# Patient Record
Sex: FEMALE | Race: WHITE | NOT HISPANIC OR LATINO | URBAN - METROPOLITAN AREA
[De-identification: names, ages, dates, MRNs, and addresses within clinical notes are randomized per-mention and may not be internally consistent; named-entity substitution may affect disease eponyms.]

---

## 2017-05-05 ENCOUNTER — INPATIENT (INPATIENT)
Facility: HOSPITAL | Age: 82
LOS: 7 days | Discharge: HOME | DRG: 329 | End: 2017-05-13
Attending: SURGERY | Admitting: SURGERY
Payer: MEDICARE

## 2017-05-05 ENCOUNTER — RESULT REVIEW (OUTPATIENT)
Age: 82
End: 2017-05-05

## 2017-05-05 VITALS
SYSTOLIC BLOOD PRESSURE: 114 MMHG | HEART RATE: 62 BPM | DIASTOLIC BLOOD PRESSURE: 73 MMHG | RESPIRATION RATE: 18 BRPM | OXYGEN SATURATION: 99 % | TEMPERATURE: 98 F

## 2017-05-05 LAB
ALBUMIN SERPL ELPH-MCNC: 3.4 G/DL — SIGNIFICANT CHANGE UP (ref 3.4–5)
ALP SERPL-CCNC: 50 U/L — SIGNIFICANT CHANGE UP (ref 40–120)
ALT FLD-CCNC: 21 U/L — SIGNIFICANT CHANGE UP (ref 12–42)
ANION GAP SERPL CALC-SCNC: 12 MMOL/L — SIGNIFICANT CHANGE UP (ref 9–16)
APPEARANCE UR: CLEAR — SIGNIFICANT CHANGE UP
AST SERPL-CCNC: 25 U/L — SIGNIFICANT CHANGE UP (ref 15–37)
BASE EXCESS BLDA CALC-SCNC: -2.2 MMOL/L — LOW (ref -2–3)
BASE EXCESS BLDA CALC-SCNC: 1.4 MMOL/L — SIGNIFICANT CHANGE UP (ref -2–3)
BASOPHILS NFR BLD AUTO: 0.2 % — SIGNIFICANT CHANGE UP (ref 0–2)
BILIRUB SERPL-MCNC: 0.3 MG/DL — SIGNIFICANT CHANGE UP (ref 0.2–1.2)
BILIRUB UR-MCNC: NEGATIVE — SIGNIFICANT CHANGE UP
BLD GP AB SCN SERPL QL: NEGATIVE — SIGNIFICANT CHANGE UP
BUN SERPL-MCNC: 24 MG/DL — HIGH (ref 7–23)
CA-I BLDA-SCNC: 0.99 MMOL/L — LOW (ref 1.1–1.3)
CA-I BLDA-SCNC: 1.15 MMOL/L — SIGNIFICANT CHANGE UP (ref 1.1–1.3)
CALCIUM SERPL-MCNC: 8.8 MG/DL — SIGNIFICANT CHANGE UP (ref 8.5–10.5)
CHLORIDE SERPL-SCNC: 98 MMOL/L — SIGNIFICANT CHANGE UP (ref 96–108)
CO2 SERPL-SCNC: 28 MMOL/L — SIGNIFICANT CHANGE UP (ref 22–31)
COHGB MFR BLDA: 0.3 % — SIGNIFICANT CHANGE UP
COHGB MFR BLDA: 0.3 % — SIGNIFICANT CHANGE UP
COLOR SPEC: YELLOW — SIGNIFICANT CHANGE UP
CREAT SERPL-MCNC: 0.86 MG/DL — SIGNIFICANT CHANGE UP (ref 0.5–1.3)
DIFF PNL FLD: NEGATIVE — SIGNIFICANT CHANGE UP
EOSINOPHIL NFR BLD AUTO: 0.1 % — SIGNIFICANT CHANGE UP (ref 0–6)
EXTRA BLUE TOP TUBE: SIGNIFICANT CHANGE UP
GAS PNL BLDA: SIGNIFICANT CHANGE UP
GAS PNL BLDA: SIGNIFICANT CHANGE UP
GLUCOSE SERPL-MCNC: 177 MG/DL — HIGH (ref 70–99)
GLUCOSE UR QL: NEGATIVE — SIGNIFICANT CHANGE UP
HCO3 BLDA-SCNC: 22 MMOL/L — SIGNIFICANT CHANGE UP (ref 21–28)
HCO3 BLDA-SCNC: 24 MMOL/L — SIGNIFICANT CHANGE UP (ref 21–28)
HCT VFR BLD CALC: 32.8 % — LOW (ref 34.5–45)
HGB BLD-MCNC: 10.8 G/DL — LOW (ref 11.5–15.5)
HGB BLDA-MCNC: 7 G/DL — LOW (ref 11.5–15.5)
HGB BLDA-MCNC: 9.6 G/DL — LOW (ref 11.5–15.5)
KETONES UR-MCNC: >=80 MG/DL
LACTATE SERPL-SCNC: 4.4 MMOL/L — CRITICAL HIGH (ref 0.5–2)
LACTATE SERPL-SCNC: 4.9 MMOL/L — CRITICAL HIGH (ref 0.5–2)
LEUKOCYTE ESTERASE UR-ACNC: NEGATIVE — SIGNIFICANT CHANGE UP
LYMPHOCYTES # BLD AUTO: 10.6 % — LOW (ref 13–44)
MCHC RBC-ENTMCNC: 26.2 PG — LOW (ref 27–34)
MCHC RBC-ENTMCNC: 32.9 G/DL — SIGNIFICANT CHANGE UP (ref 32–36)
MCV RBC AUTO: 79.6 FL — LOW (ref 80–100)
METHGB MFR BLDA: 0.1 % — SIGNIFICANT CHANGE UP
METHGB MFR BLDA: 0.3 % — SIGNIFICANT CHANGE UP
MONOCYTES NFR BLD AUTO: 4.4 % — SIGNIFICANT CHANGE UP (ref 2–14)
NEUTROPHILS NFR BLD AUTO: 84.7 % — HIGH (ref 43–77)
NITRITE UR-MCNC: NEGATIVE — SIGNIFICANT CHANGE UP
O2 CT VFR BLDA CALC: SIGNIFICANT CHANGE UP (ref 15–23)
O2 CT VFR BLDA CALC: SIGNIFICANT CHANGE UP (ref 15–23)
OXYHGB MFR BLDA: 99 % — SIGNIFICANT CHANGE UP (ref 94–100)
OXYHGB MFR BLDA: 99 % — SIGNIFICANT CHANGE UP (ref 94–100)
PCO2 BLDA: 29 MMHG — LOW (ref 32–45)
PCO2 BLDA: 37 MMHG — SIGNIFICANT CHANGE UP (ref 32–45)
PH BLDA: 7.4 — SIGNIFICANT CHANGE UP (ref 7.35–7.45)
PH BLDA: 7.54 — HIGH (ref 7.35–7.45)
PH UR: 6 — SIGNIFICANT CHANGE UP (ref 5–8)
PLATELET # BLD AUTO: 294 K/UL — SIGNIFICANT CHANGE UP (ref 150–400)
PO2 BLDA: 218 MMHG — HIGH (ref 83–108)
PO2 BLDA: 376 MMHG — HIGH (ref 83–108)
POTASSIUM BLDA-SCNC: 4.3 MMOL/L — SIGNIFICANT CHANGE UP (ref 3.5–4.9)
POTASSIUM BLDA-SCNC: 4.3 MMOL/L — SIGNIFICANT CHANGE UP (ref 3.5–4.9)
POTASSIUM SERPL-MCNC: 4.3 MMOL/L — SIGNIFICANT CHANGE UP (ref 3.5–5.3)
POTASSIUM SERPL-SCNC: 4.3 MMOL/L — SIGNIFICANT CHANGE UP (ref 3.5–5.3)
PROT SERPL-MCNC: 6.8 G/DL — SIGNIFICANT CHANGE UP (ref 6.4–8.2)
PROT UR-MCNC: NEGATIVE MG/DL — SIGNIFICANT CHANGE UP
RBC # BLD: 4.12 M/UL — SIGNIFICANT CHANGE UP (ref 3.8–5.2)
RBC # FLD: 15.1 % — SIGNIFICANT CHANGE UP (ref 10.3–16.9)
RH IG SCN BLD-IMP: POSITIVE — SIGNIFICANT CHANGE UP
SAO2 % BLDA: 100 % — SIGNIFICANT CHANGE UP (ref 95–100)
SAO2 % BLDA: 99 % — SIGNIFICANT CHANGE UP (ref 95–100)
SODIUM BLDA-SCNC: 133 MMOL/L — LOW (ref 138–146)
SODIUM BLDA-SCNC: 134 MMOL/L — LOW (ref 138–146)
SODIUM SERPL-SCNC: 138 MMOL/L — SIGNIFICANT CHANGE UP (ref 135–145)
SP GR SPEC: 1.01 — SIGNIFICANT CHANGE UP (ref 1–1.03)
UROBILINOGEN FLD QL: 0.2 E.U./DL — SIGNIFICANT CHANGE UP
WBC # BLD: 11.5 K/UL — HIGH (ref 3.8–10.5)
WBC # FLD AUTO: 11.5 K/UL — HIGH (ref 3.8–10.5)

## 2017-05-05 PROCEDURE — 99232 SBSQ HOSP IP/OBS MODERATE 35: CPT | Mod: GC

## 2017-05-05 PROCEDURE — 99285 EMERGENCY DEPT VISIT HI MDM: CPT | Mod: 25

## 2017-05-05 PROCEDURE — 93010 ELECTROCARDIOGRAM REPORT: CPT | Mod: NC

## 2017-05-05 PROCEDURE — 71010: CPT | Mod: 26

## 2017-05-05 PROCEDURE — 74177 CT ABD & PELVIS W/CONTRAST: CPT | Mod: 26

## 2017-05-05 PROCEDURE — 44120 REMOVAL OF SMALL INTESTINE: CPT

## 2017-05-05 PROCEDURE — 99222 1ST HOSP IP/OBS MODERATE 55: CPT | Mod: 57

## 2017-05-05 RX ORDER — SODIUM CHLORIDE 9 MG/ML
1000 INJECTION, SOLUTION INTRAVENOUS
Qty: 0 | Refills: 0 | Status: DISCONTINUED | OUTPATIENT
Start: 2017-05-05 | End: 2017-05-06

## 2017-05-05 RX ORDER — INSULIN LISPRO 100/ML
VIAL (ML) SUBCUTANEOUS
Qty: 0 | Refills: 0 | Status: DISCONTINUED | OUTPATIENT
Start: 2017-05-05 | End: 2017-05-13

## 2017-05-05 RX ORDER — SODIUM CHLORIDE 9 MG/ML
1000 INJECTION INTRAMUSCULAR; INTRAVENOUS; SUBCUTANEOUS ONCE
Qty: 0 | Refills: 0 | Status: COMPLETED | OUTPATIENT
Start: 2017-05-05 | End: 2017-05-05

## 2017-05-05 RX ORDER — ONDANSETRON 8 MG/1
4 TABLET, FILM COATED ORAL EVERY 6 HOURS
Qty: 0 | Refills: 0 | Status: DISCONTINUED | OUTPATIENT
Start: 2017-05-05 | End: 2017-05-05

## 2017-05-05 RX ORDER — GLUCAGON INJECTION, SOLUTION 0.5 MG/.1ML
1 INJECTION, SOLUTION SUBCUTANEOUS ONCE
Qty: 0 | Refills: 0 | Status: DISCONTINUED | OUTPATIENT
Start: 2017-05-05 | End: 2017-05-13

## 2017-05-05 RX ORDER — DEXTROSE 50 % IN WATER 50 %
12.5 SYRINGE (ML) INTRAVENOUS ONCE
Qty: 0 | Refills: 0 | Status: DISCONTINUED | OUTPATIENT
Start: 2017-05-05 | End: 2017-05-13

## 2017-05-05 RX ORDER — DEXTROSE 50 % IN WATER 50 %
25 SYRINGE (ML) INTRAVENOUS ONCE
Qty: 0 | Refills: 0 | Status: DISCONTINUED | OUTPATIENT
Start: 2017-05-05 | End: 2017-05-13

## 2017-05-05 RX ORDER — ACETAMINOPHEN 500 MG
1000 TABLET ORAL ONCE
Qty: 0 | Refills: 0 | Status: COMPLETED | OUTPATIENT
Start: 2017-05-05 | End: 2017-05-05

## 2017-05-05 RX ORDER — PROPOFOL 10 MG/ML
9.67 INJECTION, EMULSION INTRAVENOUS
Qty: 1000 | Refills: 0 | Status: DISCONTINUED | OUTPATIENT
Start: 2017-05-05 | End: 2017-05-06

## 2017-05-05 RX ORDER — DEXTROSE 50 % IN WATER 50 %
1 SYRINGE (ML) INTRAVENOUS ONCE
Qty: 0 | Refills: 0 | Status: DISCONTINUED | OUTPATIENT
Start: 2017-05-05 | End: 2017-05-13

## 2017-05-05 RX ORDER — SODIUM CHLORIDE 9 MG/ML
1000 INJECTION INTRAMUSCULAR; INTRAVENOUS; SUBCUTANEOUS
Qty: 0 | Refills: 0 | Status: DISCONTINUED | OUTPATIENT
Start: 2017-05-05 | End: 2017-05-05

## 2017-05-05 RX ORDER — FENTANYL CITRATE 50 UG/ML
0.1 INJECTION INTRAVENOUS
Qty: 2500 | Refills: 0 | Status: DISCONTINUED | OUTPATIENT
Start: 2017-05-05 | End: 2017-05-06

## 2017-05-05 RX ORDER — SODIUM CHLORIDE 9 MG/ML
1000 INJECTION, SOLUTION INTRAVENOUS
Qty: 0 | Refills: 0 | Status: DISCONTINUED | OUTPATIENT
Start: 2017-05-05 | End: 2017-05-13

## 2017-05-05 RX ORDER — LIDOCAINE 4 G/100G
10 CREAM TOPICAL ONCE
Qty: 0 | Refills: 0 | Status: COMPLETED | OUTPATIENT
Start: 2017-05-05 | End: 2017-05-05

## 2017-05-05 RX ORDER — PANTOPRAZOLE SODIUM 20 MG/1
40 TABLET, DELAYED RELEASE ORAL DAILY
Qty: 0 | Refills: 0 | Status: DISCONTINUED | OUTPATIENT
Start: 2017-05-05 | End: 2017-05-06

## 2017-05-05 RX ORDER — IOHEXOL 300 MG/ML
50 INJECTION, SOLUTION INTRAVENOUS ONCE
Qty: 0 | Refills: 0 | Status: COMPLETED | OUTPATIENT
Start: 2017-05-05 | End: 2017-05-05

## 2017-05-05 RX ORDER — HEPARIN SODIUM 5000 [USP'U]/ML
5000 INJECTION INTRAVENOUS; SUBCUTANEOUS EVERY 8 HOURS
Qty: 0 | Refills: 0 | Status: DISCONTINUED | OUTPATIENT
Start: 2017-05-05 | End: 2017-05-13

## 2017-05-05 RX ORDER — PIPERACILLIN AND TAZOBACTAM 4; .5 G/20ML; G/20ML
3.38 INJECTION, POWDER, LYOPHILIZED, FOR SOLUTION INTRAVENOUS ONCE
Qty: 0 | Refills: 0 | Status: COMPLETED | OUTPATIENT
Start: 2017-05-05 | End: 2017-05-05

## 2017-05-05 RX ORDER — PIPERACILLIN AND TAZOBACTAM 4; .5 G/20ML; G/20ML
3.38 INJECTION, POWDER, LYOPHILIZED, FOR SOLUTION INTRAVENOUS EVERY 6 HOURS
Qty: 0 | Refills: 0 | Status: DISCONTINUED | OUTPATIENT
Start: 2017-05-05 | End: 2017-05-05

## 2017-05-05 RX ORDER — ONDANSETRON 8 MG/1
4 TABLET, FILM COATED ORAL EVERY 6 HOURS
Qty: 0 | Refills: 0 | Status: DISCONTINUED | OUTPATIENT
Start: 2017-05-05 | End: 2017-05-13

## 2017-05-05 RX ADMIN — SODIUM CHLORIDE 1000 MILLILITER(S): 9 INJECTION INTRAMUSCULAR; INTRAVENOUS; SUBCUTANEOUS at 16:00

## 2017-05-05 RX ADMIN — SODIUM CHLORIDE 75 MILLILITER(S): 9 INJECTION INTRAMUSCULAR; INTRAVENOUS; SUBCUTANEOUS at 19:05

## 2017-05-05 RX ADMIN — Medication 30 MILLILITER(S): at 14:37

## 2017-05-05 RX ADMIN — Medication 1000 MILLIGRAM(S): at 19:08

## 2017-05-05 RX ADMIN — PIPERACILLIN AND TAZOBACTAM 200 GRAM(S): 4; .5 INJECTION, POWDER, LYOPHILIZED, FOR SOLUTION INTRAVENOUS at 19:07

## 2017-05-05 RX ADMIN — LIDOCAINE 10 MILLILITER(S): 4 CREAM TOPICAL at 14:37

## 2017-05-05 RX ADMIN — SODIUM CHLORIDE 1000 MILLILITER(S): 9 INJECTION INTRAMUSCULAR; INTRAVENOUS; SUBCUTANEOUS at 13:45

## 2017-05-05 RX ADMIN — IOHEXOL 50 MILLILITER(S): 300 INJECTION, SOLUTION INTRAVENOUS at 14:00

## 2017-05-05 RX ADMIN — Medication 400 MILLIGRAM(S): at 14:27

## 2017-05-05 RX ADMIN — SODIUM CHLORIDE 75 MILLILITER(S): 9 INJECTION INTRAMUSCULAR; INTRAVENOUS; SUBCUTANEOUS at 16:04

## 2017-05-05 NOTE — ED PROVIDER NOTE - OBJECTIVE STATEMENT
abdominal pain  89 yo , reports several days of constipation followed by extremely large BM today, pt reports this morning started to have severe lower abd pain which has persisted, no ho of abd surgeries, + nausea, + vomiting, no chest pain, no SOB, + lightheadness when abd pain occurs.

## 2017-05-05 NOTE — ED ADULT NURSE NOTE - OBJECTIVE STATEMENT
Pt presents to ED c/o abdominal pain since Tuesday. Pt states "I couldn't pass a BM Tuesday Wednesday or yesterday. Today I strained and passed a large soft BM but now I still have pain. Also after I went I started vomiting copiously." Pt endorses 10/10 generalized abdominal pain, denies fevers, chills, CP, SOB, difficulty urinating. Pt presents in NAD speaking full sentences via EMS stretcher through triage. Pt c/o pain and nausea, though declining pain medication and anti-nausea medication at this time.

## 2017-05-05 NOTE — H&P ADULT - ATTENDING COMMENTS
I have seen and examined the patient in the ED. CT scan images reviewed. I think she has small bowel obstruction causing the mesenteric edema from an internal hernia. Patient needs fluid resuscitation and urgent surgical exploration.

## 2017-05-05 NOTE — ED ADULT NURSE REASSESSMENT NOTE - NS ED NURSE REASSESS COMMENT FT1
Pt c/o pain and nausea, declining pain medication and zofran, states "I don't want medication." MD Brunson spoke with pt, agreed on IV Tylenol, administered as ordered by JANET Poole. Pt also states "the drink for the scan is burning my chest." PO lidocaine and maalox administered by JANET Poole as ordered. Pt now states "My whole mouth is numb, I can't swallow anymore I can't drink anymore." Explained numbness as an intended effect of medication. Pt states "I'm so thirsty," pt refusing to drink PO contrast dye though requesting plain water. MD Brunson aware, pt to have CT scan without oral contrast. Pt transported to CT by [facility]. Pt pending repeat lactate draw on completion of first NS bolus per MD Brunson and CT results. Will continue to monitor.

## 2017-05-05 NOTE — H&P ADULT - NSHPLABSRESULTS_GEN_ALL_CORE
LABS:                        10.8   11.5  )-----------( 294      ( 05 May 2017 13:25 )             32.8     05-05    138  |  98  |  24<H>  ----------------------------<  177<H>  4.3   |  28  |  0.86    Ca    8.8      05 May 2017 13:25    TPro  6.8  /  Alb  3.4  /  TBili  0.3  /  DBili  x   /  AST  25  /  ALT  21  /  AlkPhos  50  05-05      RADIOLOGY & ADDITIONAL STUDIES:      EXAM:  CT ABDOMEN AND PELVIS IC                          PROCEDURE DATE:  05/05/2017    Quantity of Contrast in Vial in ml: 100 Contrast Used: Optiray 350  Quantity of Contrast Wasted in ml: 5    INTERPRETATION:  CT of the ABDOMEN and PELVISwith intravenous contrast   dated 5/5/2017 3:39 PM    INDICATION: Abdominal pain, constipation, nausea vomiting. Appendectomy    TECHNIQUE: CT of the abdomen and pelvis was performed. Axial, sagittal   and coronal images were produced and reviewed.    PRIOR STUDIES: None.    FINDINGS: Images of the lower chest demonstrate mild aortic valve   calcification. Moderate aortic root calcification. Left ventricular   hypertrophy. Small hiatal hernia.    0.4 cm hypodensity in the left lobe of the liver, too small to   characterize.  No radiopaque stones are seen in the gallbladder.  Mildly   dilated downstream pancreatic duct measuring 0.4 cm. 0.9 cm cystic lesion   in the head of the pancreas. No splenic abnormalities are seen.    The adrenal glandsare unremarkable. A few small areas of cortical   scarring left kidney.        No abdominal aortic aneurysm is seen. Severe atherosclerotic   calcification of the abdominal aorta and its major branches. No obvious   visceral arterial or venous compromise. No lymphadenopathy is seen.     Evaluation of the bowel demonstrates mild dilatation of multiple distal   small bowel loops which show no wall enhancement. There is marked   surrounding edema of the small bowel mesentery. Findings are suspicious  for small bowel ischemia as there is hyperenhancement of proximal small   bowel and colon. Colonic diverticula noted. Stomach distended. Small to   moderate ascites is seen. No pneumatosis or free air.    Images of the pelvis demonstrate the uterusand adnexae to be normal in   appearance.      Evaluation of the osseous structures demonstrates mild to moderate   degenerative changes. Grade 1 anterolisthesis of L4 in relation to L5.      IMPRESSION:  Findings suspicious for ischemia of distal small bowel with marked   surrounding mesenteric edema. No pneumatosis or free air.    Small to moderate ascites.    Diverticulosis without evidence of diverticulitis.    Mildly dilated downstream pancreatic duct. 0.9 cm cystic lesion in the   head of the pancreas. Nonemergent MRI may be useful for further   evaluation.    Findings were discussed with REN MEDEL on 5/5/2017 4:19 PM with   read back.      MANJULA CANNON M.D., ATTENDING RADIOLOGIST  This document has been electronically signed. May  5 2017  4:21PM LABS:                        10.8   11.5  )-----------( 294      ( 05 May 2017 13:25 )             32.8     05-05    138  |  98  |  24<H>  ----------------------------<  177<H>  4.3   |  28  |  0.86    Ca    8.8      05 May 2017 13:25    TPro  6.8  /  Alb  3.4  /  TBili  0.3  /  DBili  x   /  AST  25  /  ALT  21  /  AlkPhos  50  05-05      RADIOLOGY & ADDITIONAL STUDIES:      EXAM:  CT ABDOMEN AND PELVIS IC                          PROCEDURE DATE:  05/05/2017    Quantity of Contrast in Vial in ml: 100 Contrast Used: Optiray 350  Quantity of Contrast Wasted in ml: 5    INTERPRETATION:  CT of the ABDOMEN and PELVISwith intravenous contrast   dated 5/5/2017 3:39 PM    INDICATION: Abdominal pain, constipation, nausea vomiting. Appendectomy    TECHNIQUE: CT of the abdomen and pelvis was performed. Axial, sagittal   and coronal images were produced and reviewed.    PRIOR STUDIES: None.    FINDINGS: Images of the lower chest demonstrate mild aortic valve   calcification. Moderate aortic root calcification. Left ventricular   hypertrophy. Small hiatal hernia.    0.4 cm hypodensity in the left lobe of the liver, too small to   characterize.  No radiopaque stones are seen in the gallbladder.  Mildly   dilated downstream pancreatic duct measuring 0.4 cm. 0.9 cm cystic lesion   in the head of the pancreas. No splenic abnormalities are seen.    The adrenal glands are unremarkable. A few small areas of cortical   scarring left kidney.        No abdominal aortic aneurysm is seen. Severe atherosclerotic   calcification of the abdominal aorta and its major branches. No obvious   visceral arterial or venous compromise. No lymphadenopathy is seen.     Evaluation of the bowel demonstrates mild dilatation of multiple distal   small bowel loops which show no wall enhancement. There is marked   surrounding edema of the small bowel mesentery. Findings are suspicious  for small bowel ischemia as there is hyper enhancement of proximal small   bowel and colon. Colonic diverticula noted. Stomach distended. Small to   moderate ascites is seen. No pneumatosis or free air.    Images of the pelvis demonstrate the uterus and adnexae to be normal in   appearance.      Evaluation of the osseous structures demonstrates mild to moderate   degenerative changes. Grade 1 anterolisthesis of L4 in relation to L5.      IMPRESSION:  Findings suspicious for ischemia of distal small bowel with marked   surrounding mesenteric edema. No pneumatosis or free air.    Small to moderate ascites.    Diverticulosis without evidence of diverticulitis.    Mildly dilated downstream pancreatic duct. 0.9 cm cystic lesion in the   head of the pancreas. Nonemergent MRI may be useful for further   evaluation.    Findings were discussed with REN MEDEL on 5/5/2017 4:19 PM with   read back.      MANJULA CANNON M.D., ATTENDING RADIOLOGIST  This document has been electronically signed. May  5 2017  4:21PM

## 2017-05-05 NOTE — CONSULT NOTE ADULT - ATTENDING COMMENTS
89F ischemic bowel, sp resection of mid small bowel.   1. hemodynamically adequate. lactaet nl suggesting adequate perfusion  2. keep on mech vent overnight. sedation analgesia  3. ivf maintenance  4. zosyn perioperative abx  5. ngt

## 2017-05-05 NOTE — CONSULT NOTE ADULT - ASSESSMENT
88 yo F with hemorrhagic ischemic bowel now s/p exlap with SBR  Neuro: propofol, fentanyl  CV: LR @120. Normotensive goals  Pulm: CMV  GI: NPO, protonics NGT  : Cartagena  Endo: ISS.   ID: zosyn (5/6---_  Lines: PIV, a-line

## 2017-05-05 NOTE — H&P ADULT - ASSESSMENT
89F w/ sudden onset abdominal pain likely 2/2 to small bowel ischemia as evidenced on CT scan   - Admit to general surgery, telemetry  - NPO/IVF  - hold NGT for now given patient's apprehension, but place if severe vomiting  - Nausea control PRN  - Pain control without narcotics  - Serial abdominal exams  - Serial Lactates

## 2017-05-05 NOTE — ED ADULT NURSE REASSESSMENT NOTE - NS ED NURSE REASSESS COMMENT FT1
Pt remains in ED pending bed assignment or OR. Pt placed on portable continuous cardiac monitor. 14F Cartagena placed by RNs Melly and Royal as per surgery order. Pt with maintenance fluids increased to 150ml/hr per surgery request, pt also with zosyn 3.375 mg hanging as ordered. Will continue to monitor.

## 2017-05-05 NOTE — H&P ADULT - HISTORY OF PRESENT ILLNESS
89F w/ questionable PMH of dementia, hyperparathyroidism, and recent dental procedure presented to [facility] ED w/ complaints of severe abdominal pain with associated nausea/vomiting. Patient reports no abdominal surgeries in the past. Reports that she never experienced anything like this before. Denies hx of Afib. Denies any pertinent cardiac history. Denies f/c at home. Reports normal BM earlier today. 89F w/ questionable PMH of dementia, hyperparathyroidism, and recent dental procedure presented to [facility] ED w/ complaints of severe abdominal pain with associated nausea/vomiting. Patient reports no abdominal surgeries in the past. Reports that she never experienced anything like this before. Denies hx of Afib. Denies any pertinent cardiac history. Denies f/c at home. Reports having to strain in order to move her bowel today.

## 2017-05-05 NOTE — CONSULT NOTE ADULT - SUBJECTIVE AND OBJECTIVE BOX
HPI:      PAST MEDICAL & SURGICAL HISTORY:  Depression, unspecified depression type  Gastroesophageal reflux disease without esophagitis  HTN (hypertension)  History of cholecystectomy  No significant past surgical history      REVIEW OF SYSTEMS      General:	Intubated, sedated. unable to obtain ROS.     Skin/Breast:  	  Ophthalmologic:  	  ENMT:	    Respiratory and Thorax:  	  Cardiovascular:	    Gastrointestinal:	    Genitourinary:	    Musculoskeletal:	    Neurological:	    Psychiatric:	    Hematology/Lymphatics:	    Endocrine:	    Allergic/Immunologic:	    MEDICATIONS  (STANDING):    MEDICATIONS  (PRN):      Allergies    ibuprofen (Hives)  Toradol (Rash)    Intolerances        SOCIAL HISTORY:    FAMILY HISTORY:      Vital Signs Last 24 Hrs  T(C): 36.8, Max: 36.8 (05-05 @ 20:36)  T(F): 98.2, Max: 98.2 (05-05 @ 20:36)  HR: 98 (98 - 125)  BP: 121/83 (115/85 - 132/75)  BP(mean): --  RR: 20 (18 - 20)  SpO2: 97% (97% - 98%)    PHYSICAL EXAM:      Constitutional:    Eyes:    ENMT:    Neck:    Breasts:    Back:    Respiratory:    Cardiovascular:    Gastrointestinal:    Genitourinary:    Rectal:    Extremities:    Vascular:    Neurological:    Skin:    Lymph Nodes:    Musculoskeletal:    Psychiatric:        LABS:                        14.9   10.8  )-----------( 327      ( 05 May 2017 17:39 )             42.1     05-05    140  |  106  |  17  ----------------------------<  137<H>  4.1   |  22  |  0.77    Ca    8.8      05 May 2017 17:39    TPro  7.9  /  Alb  3.3<L>  /  TBili  0.5  /  DBili  x   /  AST  25  /  ALT  23  /  AlkPhos  87  05-05    PT/INR - ( 05 May 2017 17:39 )   PT: 12.0 sec;   INR: 1.08                RADIOLOGY & ADDITIONAL STUDIES: HPI:  89 yo F with h/o hypothyroidism presented with hemorrhagic ischemic bowel now s/p  bowel resection. She is intubated and HD stable.       PAST MEDICAL & SURGICAL HISTORY:  Depression, unspecified depression type  Gastroesophageal reflux disease without esophagitis  HTN (hypertension)  History of cholecystectomy  No significant past surgical history      REVIEW OF SYSTEMS      General:	Intubated, sedated. unable to obtain ROS.     MEDICATIONS  (STANDING):    MEDICATIONS  (PRN):      Allergies    ibuprofen (Hives)  Toradol (Rash)    Intolerances        SOCIAL HISTORY:  non-smoker    FAMILY HISTORY:      Vital Signs Last 24 Hrs  T(C): 36.8, Max: 36.8 (05-05 @ 20:36)  T(F): 98.2, Max: 98.2 (05-05 @ 20:36)  HR: 98 (98 - 125)  BP: 121/83 (115/85 - 132/75)  BP(mean): --  RR: 20 (18 - 20)  SpO2: 97% (97% - 98%)    PHYSICAL EXAM:      Constitutional: Intubated, sedated    Eyes:     ENMT:    Neck:    Breasts:    Back:    Respiratory:    Cardiovascular:    Gastrointestinal:    Genitourinary:    Rectal:    Extremities:    Vascular:    Neurological:    Skin:    Lymph Nodes:    Musculoskeletal:    Psychiatric:        LABS:                        14.9   10.8  )-----------( 327      ( 05 May 2017 17:39 )             42.1     05-05    140  |  106  |  17  ----------------------------<  137<H>  4.1   |  22  |  0.77    Ca    8.8      05 May 2017 17:39    TPro  7.9  /  Alb  3.3<L>  /  TBili  0.5  /  DBili  x   /  AST  25  /  ALT  23  /  AlkPhos  87  05-05    PT/INR - ( 05 May 2017 17:39 )   PT: 12.0 sec;   INR: 1.08                RADIOLOGY & ADDITIONAL STUDIES: HPI:  88 yo F with h/o hypothyroidism presented with hemorrhagic ischemic bowel now s/p  bowel resection. She is intubated and HD stable.       PAST MEDICAL & SURGICAL HISTORY:  Depression, unspecified depression type  Gastroesophageal reflux disease without esophagitis  HTN (hypertension)  History of cholecystectomy  No significant past surgical history      REVIEW OF SYSTEMS      General:	Intubated, sedated. unable to obtain ROS.     MEDICATIONS  (STANDING):    MEDICATIONS  (PRN):      Allergies    ibuprofen (Hives)  Toradol (Rash)    Intolerances        SOCIAL HISTORY:  non-smoker    FAMILY HISTORY:      Vital Signs Last 24 Hrs  T(C): 36.8, Max: 36.8 (05-05 @ 20:36)  T(F): 98.2, Max: 98.2 (05-05 @ 20:36)  HR: 98 (98 - 125)  BP: 121/83 (115/85 - 132/75)  BP(mean): --  RR: 20 (18 - 20)  SpO2: 97% (97% - 98%)    PHYSICAL EXAM:      Constitutional: Intubated, sedated    Eyes: PERRL    ENMT:NCAT    Respiratory:CMV, CTAB    Cardiovascular: RRR    Gastrointestinal:Soft, appropriately tender, ND    Genitourinary: Cartagena in place    Extremities: cool with brisk cap refill    Vascular: +fem pulses. No pedal pulses b/l.     Neurological: responds to commands. Moves all 4 extremities    Skin: no lesions    Musculoskeletal: decreased muscle bulk.           LABS:                        14.9   10.8  )-----------( 327      ( 05 May 2017 17:39 )             42.1     05-05    140  |  106  |  17  ----------------------------<  137<H>  4.1   |  22  |  0.77    Ca    8.8      05 May 2017 17:39    TPro  7.9  /  Alb  3.3<L>  /  TBili  0.5  /  DBili  x   /  AST  25  /  ALT  23  /  AlkPhos  87  05-05    PT/INR - ( 05 May 2017 17:39 )   PT: 12.0 sec;   INR: 1.08                RADIOLOGY & ADDITIONAL STUDIES:

## 2017-05-05 NOTE — ED ADULT TRIAGE NOTE - CHIEF COMPLAINT QUOTE
BIBA from home c/o abdominal pain accompanied with nausea. Pt  reports being constipated yesterday and had +BM today described as being large amount.

## 2017-05-05 NOTE — ED PROVIDER NOTE - CONSTITUTIONAL, MLM
normal... Well appearing, well nourished, awake, alert, oriented to person, place, time/situation , appears uncomfortable

## 2017-05-05 NOTE — ED PROVIDER NOTE - PSYCHIATRIC, MLM
Alert and oriented to person, place, time/situation. odd affect, . no apparent risk to self or others.

## 2017-05-05 NOTE — ED PROVIDER NOTE - MEDICAL DECISION MAKING DETAILS
88 yo with abd pain, constipation, n/v.  Suspect may be secondary to hypocalcemia, also consider colitis, diverticulitis, SBO, appy, consider mesenteric ischemia as elevated lactic but less likely as no afib.

## 2017-05-05 NOTE — H&P ADULT - NSHPPHYSICALEXAM_GEN_ALL_CORE
Vital Signs Last 24 Hrs  T(C): 36.7, Max: 36.7 (05-05 @ 18:38)  T(F): 98, Max: 98 (05-05 @ 18:38)  HR: 86 (62 - 88)  BP: 99/68 (92/61 - 114/73)  BP(mean): --  RR: 18 (18 - 18)  SpO2: 94% (94% - 99%)    PHYSICAL EXAM:    Constitutional: A&Ox3  Respiratory: non labored breathing, no respiratory distress  Cardiovascular: NSR, RRR  Gastrointestinal: Soft, MD, TTP worst in RLQ   Genitourinary:  Extremities: (-) edema Vital Signs Last 24 Hrs  T(C): 36.7, Max: 36.7 (05-05 @ 18:38)  T(F): 98, Max: 98 (05-05 @ 18:38)  HR: 86 (62 - 88)  BP: 99/68 (92/61 - 114/73)  BP(mean): --  RR: 18 (18 - 18)  SpO2: 94% (94% - 99%)    PHYSICAL EXAM:    Constitutional: A&Ox3  Respiratory: non labored breathing, no respiratory distress  Cardiovascular: NSR, RRR  Gastrointestinal: Soft, MD, TTP worst in the mid-abdomen and not well localized. Heme positive stool.   Genitourinary:  Extremities: (-) edema

## 2017-05-05 NOTE — ED PROVIDER NOTE - PROGRESS NOTE DETAILS
pt w elevated lactic, as 89 , concern for fluid overload w too aggressive bolus'ing , vitals ok, will repeat Lactic acid after administration of 1 L NS and initiate maintenance fluids, will consider additional bolus if remains elevated. surgery aware

## 2017-05-06 DIAGNOSIS — E21.3 HYPERPARATHYROIDISM, UNSPECIFIED: ICD-10-CM

## 2017-05-06 DIAGNOSIS — R10.9 UNSPECIFIED ABDOMINAL PAIN: ICD-10-CM

## 2017-05-06 LAB
ALBUMIN SERPL ELPH-MCNC: 2.1 G/DL — LOW (ref 3.4–5)
ANION GAP SERPL CALC-SCNC: 8 MMOL/L — LOW (ref 9–16)
ANION GAP SERPL CALC-SCNC: 9 MMOL/L — SIGNIFICANT CHANGE UP (ref 9–16)
APTT BLD: 34.1 SEC — SIGNIFICANT CHANGE UP (ref 27.5–37.4)
BASE EXCESS BLDA CALC-SCNC: -0.8 MMOL/L — SIGNIFICANT CHANGE UP (ref -2–3)
BASE EXCESS BLDA CALC-SCNC: -1.7 MMOL/L — SIGNIFICANT CHANGE UP (ref -2–3)
BASE EXCESS BLDA CALC-SCNC: 0.2 MMOL/L — SIGNIFICANT CHANGE UP (ref -2–3)
BUN SERPL-MCNC: 23 MG/DL — SIGNIFICANT CHANGE UP (ref 7–23)
BUN SERPL-MCNC: 25 MG/DL — HIGH (ref 7–23)
CA-I BLDA-SCNC: 0.98 MMOL/L — LOW (ref 1.1–1.3)
CALCIUM SERPL-MCNC: 7.4 MG/DL — LOW (ref 8.5–10.5)
CALCIUM SERPL-MCNC: 7.5 MG/DL — LOW (ref 8.5–10.5)
CALCIUM SERPL-MCNC: 7.5 MG/DL — LOW (ref 8.5–10.5)
CHLORIDE SERPL-SCNC: 107 MMOL/L — SIGNIFICANT CHANGE UP (ref 96–108)
CHLORIDE SERPL-SCNC: 107 MMOL/L — SIGNIFICANT CHANGE UP (ref 96–108)
CO2 SERPL-SCNC: 26 MMOL/L — SIGNIFICANT CHANGE UP (ref 22–31)
CO2 SERPL-SCNC: 26 MMOL/L — SIGNIFICANT CHANGE UP (ref 22–31)
COHGB MFR BLDA: 0.3 % — SIGNIFICANT CHANGE UP
CREAT SERPL-MCNC: 0.81 MG/DL — SIGNIFICANT CHANGE UP (ref 0.5–1.3)
CREAT SERPL-MCNC: 0.81 MG/DL — SIGNIFICANT CHANGE UP (ref 0.5–1.3)
CULTURE RESULTS: NO GROWTH — SIGNIFICANT CHANGE UP
GAS PNL BLDA: SIGNIFICANT CHANGE UP
GLUCOSE SERPL-MCNC: 136 MG/DL — HIGH (ref 70–99)
GLUCOSE SERPL-MCNC: 145 MG/DL — HIGH (ref 70–99)
HBA1C BLD-MCNC: 5.3 % — SIGNIFICANT CHANGE UP (ref 4.8–5.6)
HCO3 BLDA-SCNC: 23 MMOL/L — SIGNIFICANT CHANGE UP (ref 21–28)
HCO3 BLDA-SCNC: 24 MMOL/L — SIGNIFICANT CHANGE UP (ref 21–28)
HCO3 BLDA-SCNC: 25 MMOL/L — SIGNIFICANT CHANGE UP (ref 21–28)
HCT VFR BLD CALC: 27 % — LOW (ref 34.5–45)
HCT VFR BLD CALC: 30.6 % — LOW (ref 34.5–45)
HGB BLD-MCNC: 10.4 G/DL — LOW (ref 11.5–15.5)
HGB BLD-MCNC: 9.2 G/DL — LOW (ref 11.5–15.5)
HGB BLDA-MCNC: 8.7 G/DL — LOW (ref 11.5–15.5)
INR BLD: 1.18 — HIGH (ref 0.88–1.16)
LACTATE SERPL-SCNC: 1 MMOL/L — SIGNIFICANT CHANGE UP (ref 0.5–2)
MAGNESIUM SERPL-MCNC: 1.6 MG/DL — SIGNIFICANT CHANGE UP (ref 1.6–2.4)
MAGNESIUM SERPL-MCNC: 3.2 MG/DL — HIGH (ref 1.6–2.4)
MCHC RBC-ENTMCNC: 27.4 PG — SIGNIFICANT CHANGE UP (ref 27–34)
MCHC RBC-ENTMCNC: 27.6 PG — SIGNIFICANT CHANGE UP (ref 27–34)
MCHC RBC-ENTMCNC: 34 G/DL — SIGNIFICANT CHANGE UP (ref 32–36)
MCHC RBC-ENTMCNC: 34.1 G/DL — SIGNIFICANT CHANGE UP (ref 32–36)
MCV RBC AUTO: 80.7 FL — SIGNIFICANT CHANGE UP (ref 80–100)
MCV RBC AUTO: 81.1 FL — SIGNIFICANT CHANGE UP (ref 80–100)
O2 CT VFR BLDA CALC: SIGNIFICANT CHANGE UP (ref 15–23)
OXYHGB MFR BLDA: 99 % — SIGNIFICANT CHANGE UP (ref 94–100)
PCO2 BLDA: 41 MMHG — SIGNIFICANT CHANGE UP (ref 32–45)
PCO2 BLDA: 41 MMHG — SIGNIFICANT CHANGE UP (ref 32–45)
PCO2 BLDA: 43 MMHG — SIGNIFICANT CHANGE UP (ref 32–45)
PH BLDA: 7.38 — SIGNIFICANT CHANGE UP (ref 7.35–7.45)
PH BLDA: 7.39 — SIGNIFICANT CHANGE UP (ref 7.35–7.45)
PH BLDA: 7.39 — SIGNIFICANT CHANGE UP (ref 7.35–7.45)
PHOSPHATE SERPL-MCNC: 5.1 MG/DL — HIGH (ref 2.5–4.5)
PHOSPHATE SERPL-MCNC: 5.2 MG/DL — HIGH (ref 2.5–4.5)
PLATELET # BLD AUTO: 173 K/UL — SIGNIFICANT CHANGE UP (ref 150–400)
PLATELET # BLD AUTO: 191 K/UL — SIGNIFICANT CHANGE UP (ref 150–400)
PO2 BLDA: 142 MMHG — HIGH (ref 83–108)
PO2 BLDA: 144 MMHG — HIGH (ref 83–108)
PO2 BLDA: 352 MMHG — HIGH (ref 83–108)
POTASSIUM BLDA-SCNC: 4.3 MMOL/L — SIGNIFICANT CHANGE UP (ref 3.5–4.9)
POTASSIUM SERPL-MCNC: 4.5 MMOL/L — SIGNIFICANT CHANGE UP (ref 3.5–5.3)
POTASSIUM SERPL-MCNC: 4.5 MMOL/L — SIGNIFICANT CHANGE UP (ref 3.5–5.3)
POTASSIUM SERPL-SCNC: 4.5 MMOL/L — SIGNIFICANT CHANGE UP (ref 3.5–5.3)
POTASSIUM SERPL-SCNC: 4.5 MMOL/L — SIGNIFICANT CHANGE UP (ref 3.5–5.3)
PROTHROM AB SERPL-ACNC: 13.1 SEC — HIGH (ref 9.8–12.7)
RBC # BLD: 3.33 M/UL — LOW (ref 3.8–5.2)
RBC # BLD: 3.79 M/UL — LOW (ref 3.8–5.2)
RBC # FLD: 15 % — SIGNIFICANT CHANGE UP (ref 10.3–16.9)
RBC # FLD: 15.2 % — SIGNIFICANT CHANGE UP (ref 10.3–16.9)
SAO2 % BLDA: 99 % — SIGNIFICANT CHANGE UP (ref 95–100)
SODIUM BLDA-SCNC: 134 MMOL/L — LOW (ref 138–146)
SODIUM SERPL-SCNC: 141 MMOL/L — SIGNIFICANT CHANGE UP (ref 135–145)
SODIUM SERPL-SCNC: 142 MMOL/L — SIGNIFICANT CHANGE UP (ref 135–145)
SPECIMEN SOURCE: SIGNIFICANT CHANGE UP
WBC # BLD: 8.6 K/UL — SIGNIFICANT CHANGE UP (ref 3.8–10.5)
WBC # BLD: 9.8 K/UL — SIGNIFICANT CHANGE UP (ref 3.8–10.5)
WBC # FLD AUTO: 8.6 K/UL — SIGNIFICANT CHANGE UP (ref 3.8–10.5)
WBC # FLD AUTO: 9.8 K/UL — SIGNIFICANT CHANGE UP (ref 3.8–10.5)

## 2017-05-06 PROCEDURE — 71010: CPT | Mod: 26

## 2017-05-06 PROCEDURE — 99233 SBSQ HOSP IP/OBS HIGH 50: CPT | Mod: GC

## 2017-05-06 RX ORDER — BENZOCAINE 10 %
1 GEL (GRAM) MUCOUS MEMBRANE EVERY 4 HOURS
Qty: 0 | Refills: 0 | Status: DISCONTINUED | OUTPATIENT
Start: 2017-05-06 | End: 2017-05-13

## 2017-05-06 RX ORDER — ACETAMINOPHEN 500 MG
1000 TABLET ORAL ONCE
Qty: 0 | Refills: 0 | Status: COMPLETED | OUTPATIENT
Start: 2017-05-06 | End: 2017-05-07

## 2017-05-06 RX ORDER — PIPERACILLIN AND TAZOBACTAM 4; .5 G/20ML; G/20ML
3.38 INJECTION, POWDER, LYOPHILIZED, FOR SOLUTION INTRAVENOUS EVERY 8 HOURS
Qty: 0 | Refills: 0 | Status: DISCONTINUED | OUTPATIENT
Start: 2017-05-06 | End: 2017-05-07

## 2017-05-06 RX ORDER — HYDROMORPHONE HYDROCHLORIDE 2 MG/ML
0.5 INJECTION INTRAMUSCULAR; INTRAVENOUS; SUBCUTANEOUS
Qty: 0 | Refills: 0 | Status: DISCONTINUED | OUTPATIENT
Start: 2017-05-06 | End: 2017-05-06

## 2017-05-06 RX ORDER — MAGNESIUM SULFATE 500 MG/ML
2 VIAL (ML) INJECTION ONCE
Qty: 0 | Refills: 0 | Status: COMPLETED | OUTPATIENT
Start: 2017-05-06 | End: 2017-05-06

## 2017-05-06 RX ORDER — SODIUM CHLORIDE 9 MG/ML
1000 INJECTION, SOLUTION INTRAVENOUS
Qty: 0 | Refills: 0 | Status: DISCONTINUED | OUTPATIENT
Start: 2017-05-06 | End: 2017-05-07

## 2017-05-06 RX ADMIN — HEPARIN SODIUM 5000 UNIT(S): 5000 INJECTION INTRAVENOUS; SUBCUTANEOUS at 14:35

## 2017-05-06 RX ADMIN — PIPERACILLIN AND TAZOBACTAM 25 GRAM(S): 4; .5 INJECTION, POWDER, LYOPHILIZED, FOR SOLUTION INTRAVENOUS at 14:34

## 2017-05-06 RX ADMIN — Medication 50 GRAM(S): at 03:07

## 2017-05-06 RX ADMIN — HYDROMORPHONE HYDROCHLORIDE 0.5 MILLIGRAM(S): 2 INJECTION INTRAMUSCULAR; INTRAVENOUS; SUBCUTANEOUS at 20:00

## 2017-05-06 RX ADMIN — PIPERACILLIN AND TAZOBACTAM 200 GRAM(S): 4; .5 INJECTION, POWDER, LYOPHILIZED, FOR SOLUTION INTRAVENOUS at 22:45

## 2017-05-06 RX ADMIN — PROPOFOL 3 MICROGRAM(S)/KG/MIN: 10 INJECTION, EMULSION INTRAVENOUS at 00:30

## 2017-05-06 RX ADMIN — HYDROMORPHONE HYDROCHLORIDE 0.5 MILLIGRAM(S): 2 INJECTION INTRAMUSCULAR; INTRAVENOUS; SUBCUTANEOUS at 19:45

## 2017-05-06 RX ADMIN — HEPARIN SODIUM 5000 UNIT(S): 5000 INJECTION INTRAVENOUS; SUBCUTANEOUS at 05:19

## 2017-05-06 RX ADMIN — FENTANYL CITRATE 0.52 MICROGRAM(S)/KG/HR: 50 INJECTION INTRAVENOUS at 02:50

## 2017-05-06 RX ADMIN — PIPERACILLIN AND TAZOBACTAM 25 GRAM(S): 4; .5 INJECTION, POWDER, LYOPHILIZED, FOR SOLUTION INTRAVENOUS at 05:19

## 2017-05-06 RX ADMIN — HEPARIN SODIUM 5000 UNIT(S): 5000 INJECTION INTRAVENOUS; SUBCUTANEOUS at 22:45

## 2017-05-06 NOTE — PROGRESS NOTE ADULT - SUBJECTIVE AND OBJECTIVE BOX
S: Pt sedated, ROS deferred    Vitals: Vital Signs Last 24 Hrs  T(C): 36.5, Max: 36.7 ( @ 18:38)  T(F): 97.7, Max: 98 ( @ 18:38)  HR: 58 (58 - 88)  BP: 134/62 (92/61 - 185/89)  BP(mean): --  RR: 10 (8 - 18)  SpO2: 100% (94% - 100%)    CAPILLARY BLOOD GLUCOSE      I & Os for 24h ending  @ 07:00  =============================================  IN: 1299.5 ml / OUT: 920 ml / NET: 379.5 ml    I & Os for current day (as of  @ 08:50)  =============================================  IN: 132 ml / OUT: 35 ml / NET: 97 ml        Weight (kg): 51.7 ( @ 14:05)    Labs:                         10.4   9.8   )-----------( 173      ( 06 May 2017 04:45 )             30.6   -06    141  |  107  |  23  ----------------------------<  145<H>  4.5   |  26  |  0.81    Ca    7.5<L>      06 May 2017 04:45  Phos  5.2     05-06  Mg     3.2     -06    TPro  6.8  /  Alb  3.4  /  TBili  0.3  /  DBili  x   /  AST  25  /  ALT  21  /  AlkPhos  50  05-05  PT/INR - ( 06 May 2017 01:13 )   PT: 13.1 sec;   INR: 1.18          PTT - ( 06 May 2017 01:13 )  PTT:34.1 secUrinalysis Basic - ( 05 May 2017 19:12 )    Color: Yellow / Appearance: Clear / S.015 / pH: x  Gluc: x / Ketone: >=80 mg/dL  / Bili: NEGATIVE / Urobili: 0.2 E.U./dL   Blood: x / Protein: NEGATIVE mg/dL / Nitrite: NEGATIVE   Leuk Esterase: NEGATIVE / RBC: x / WBC x   Sq Epi: x / Non Sq Epi: x / Bacteria: x        Electrolytes repleated to goals as follows: Potassium 4, Phosphorus 3 and Magnesium 2 where appropriate and necessary    Exam:  Neuro: sedated  CV: RRR, no MRGs  Pulm: CTAB, no wheezes, rales ronzenaida  Abd: BS (-), Soft, midline incision dressing c/d/i, NT, ND  Ext: No edema peripherally or centrally  Vasc: Extremities warm to palpation, 2+ pulses - radial and PT S: Pt sedated, ROS deferred    Vitals: Vital Signs Last 24 Hrs  T(C): 36.5, Max: 36.7 ( @ 18:38)  T(F): 97.7, Max: 98 ( @ 18:38)  HR: 58 (58 - 88)  BP: 134/62 (92/61 - 185/89)  BP(mean): --  RR: 10 (8 - 18)  SpO2: 100% (94% - 100%)    CAPILLARY BLOOD GLUCOSE      I & Os for 24h ending  @ 07:00  =============================================  IN: 1299.5 ml / OUT: 920 ml / NET: 379.5 ml    I & Os for current day (as of  @ 08:50)  =============================================  IN: 132 ml / OUT: 35 ml / NET: 97 ml        Weight (kg): 51.7 ( @ 14:05)    Labs:                         10.4   9.8   )-----------( 173      ( 06 May 2017 04:45 )             30.6   -06    141  |  107  |  23  ----------------------------<  145<H>  4.5   |  26  |  0.81    Ca    7.5<L>      06 May 2017 04:45  Phos  5.2     05-06  Mg     3.2     -06    TPro  6.8  /  Alb  3.4  /  TBili  0.3  /  DBili  x   /  AST  25  /  ALT  21  /  AlkPhos  50  05-05  PT/INR - ( 06 May 2017 01:13 )   PT: 13.1 sec;   INR: 1.18          PTT - ( 06 May 2017 01:13 )  PTT:34.1 secUrinalysis Basic - ( 05 May 2017 19:12 )    Color: Yellow / Appearance: Clear / S.015 / pH: x  Gluc: x / Ketone: >=80 mg/dL  / Bili: NEGATIVE / Urobili: 0.2 E.U./dL   Blood: x / Protein: NEGATIVE mg/dL / Nitrite: NEGATIVE   Leuk Esterase: NEGATIVE / RBC: x / WBC x   Sq Epi: x / Non Sq Epi: x / Bacteria: x        Electrolytes repleated to goals as follows: Potassium 4, Phosphorus 3 and Magnesium 2 where appropriate and necessary    Exam:  Neuro: sedated  HEENT: PERRL, MMM  CV: RRR, no MRGs  Pulm: CTAB, no wheezes, rales ronchi  Abd: BS (-), Soft, midline incision dressing c/d/i, NT, ND  : alicea in place  Ext: No edema peripherally or centrally  Vasc: Extremities warm to palpation, 2+ pulses - radial and PT  MSK: no joint swelling  Skin: no rash

## 2017-05-06 NOTE — BRIEF OPERATIVE NOTE - OPERATION/FINDINGS
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 Converted to laparotomy, SBR. remaining intestine 180cm. side to side functional end-to-end stapled anastomosis

## 2017-05-06 NOTE — PROGRESS NOTE ADULT - ASSESSMENT
89 yo F with hemorrhagic ischemic bowel now s/p exlap with SBR    Neuro: propofol, fentanyl - wean to extubate  CV: LR @120. Normotensive goals  Pulm: CMV - wean to extubate  GI: NPO, protonix,  NGT to LIWS  : Mitzi  Endo: ISS.   ID: zosyn (5/6--)   Lines: PIV, a-line 89 yo F with hemorrhagic ischemic bowel now s/p exlap with SBR with postoperative acute respiratory failure    Neuro: propofol, fentanyl - wean to extubate  CV: LR @120. Normotensive goals  Pulm: CMV - wean to extubate as looks ok on CPAP  GI: NPO, protonix,  NGT to LIWS  : Mtizi  Endo: ISS.   ID: zosyn (5/6--)   Lines: PIV, a-line

## 2017-05-07 LAB
ANION GAP SERPL CALC-SCNC: 6 MMOL/L — LOW (ref 9–16)
ANION GAP SERPL CALC-SCNC: 7 MMOL/L — LOW (ref 9–16)
BUN SERPL-MCNC: 14 MG/DL — SIGNIFICANT CHANGE UP (ref 7–23)
BUN SERPL-MCNC: 16 MG/DL — SIGNIFICANT CHANGE UP (ref 7–23)
CALCIUM SERPL-MCNC: 7.2 MG/DL — LOW (ref 8.5–10.5)
CALCIUM SERPL-MCNC: 7.3 MG/DL — LOW (ref 8.5–10.5)
CHLORIDE SERPL-SCNC: 104 MMOL/L — SIGNIFICANT CHANGE UP (ref 96–108)
CHLORIDE SERPL-SCNC: 105 MMOL/L — SIGNIFICANT CHANGE UP (ref 96–108)
CO2 SERPL-SCNC: 27 MMOL/L — SIGNIFICANT CHANGE UP (ref 22–31)
CO2 SERPL-SCNC: 28 MMOL/L — SIGNIFICANT CHANGE UP (ref 22–31)
CREAT SERPL-MCNC: 0.84 MG/DL — SIGNIFICANT CHANGE UP (ref 0.5–1.3)
CREAT SERPL-MCNC: 0.85 MG/DL — SIGNIFICANT CHANGE UP (ref 0.5–1.3)
GLUCOSE SERPL-MCNC: 101 MG/DL — HIGH (ref 70–99)
GLUCOSE SERPL-MCNC: 88 MG/DL — SIGNIFICANT CHANGE UP (ref 70–99)
HCT VFR BLD CALC: 27.7 % — LOW (ref 34.5–45)
HCT VFR BLD CALC: 28 % — LOW (ref 34.5–45)
HGB BLD-MCNC: 9.3 G/DL — LOW (ref 11.5–15.5)
HGB BLD-MCNC: 9.3 G/DL — LOW (ref 11.5–15.5)
MAGNESIUM SERPL-MCNC: 2.2 MG/DL — SIGNIFICANT CHANGE UP (ref 1.6–2.4)
MCHC RBC-ENTMCNC: 27.7 PG — SIGNIFICANT CHANGE UP (ref 27–34)
MCHC RBC-ENTMCNC: 28 PG — SIGNIFICANT CHANGE UP (ref 27–34)
MCHC RBC-ENTMCNC: 33.2 G/DL — SIGNIFICANT CHANGE UP (ref 32–36)
MCHC RBC-ENTMCNC: 33.6 G/DL — SIGNIFICANT CHANGE UP (ref 32–36)
MCV RBC AUTO: 83.3 FL — SIGNIFICANT CHANGE UP (ref 80–100)
MCV RBC AUTO: 83.4 FL — SIGNIFICANT CHANGE UP (ref 80–100)
PHOSPHATE SERPL-MCNC: 3.4 MG/DL — SIGNIFICANT CHANGE UP (ref 2.5–4.5)
PLATELET # BLD AUTO: 174 K/UL — SIGNIFICANT CHANGE UP (ref 150–400)
PLATELET # BLD AUTO: 178 K/UL — SIGNIFICANT CHANGE UP (ref 150–400)
POTASSIUM SERPL-MCNC: 3.9 MMOL/L — SIGNIFICANT CHANGE UP (ref 3.5–5.3)
POTASSIUM SERPL-MCNC: 4.2 MMOL/L — SIGNIFICANT CHANGE UP (ref 3.5–5.3)
POTASSIUM SERPL-SCNC: 3.9 MMOL/L — SIGNIFICANT CHANGE UP (ref 3.5–5.3)
POTASSIUM SERPL-SCNC: 4.2 MMOL/L — SIGNIFICANT CHANGE UP (ref 3.5–5.3)
RBC # BLD: 3.32 M/UL — LOW (ref 3.8–5.2)
RBC # BLD: 3.36 M/UL — LOW (ref 3.8–5.2)
RBC # FLD: 16.1 % — SIGNIFICANT CHANGE UP (ref 10.3–16.9)
RBC # FLD: 16.4 % — SIGNIFICANT CHANGE UP (ref 10.3–16.9)
SODIUM SERPL-SCNC: 138 MMOL/L — SIGNIFICANT CHANGE UP (ref 135–145)
SODIUM SERPL-SCNC: 139 MMOL/L — SIGNIFICANT CHANGE UP (ref 135–145)
TROPONIN I SERPL-MCNC: 0.08 NG/ML — HIGH (ref 0.01–0.04)
TROPONIN I SERPL-MCNC: 0.09 NG/ML — HIGH (ref 0.01–0.04)
TROPONIN I SERPL-MCNC: 0.09 NG/ML — HIGH (ref 0.01–0.04)
WBC # BLD: 10.2 K/UL — SIGNIFICANT CHANGE UP (ref 3.8–10.5)
WBC # BLD: 9.5 K/UL — SIGNIFICANT CHANGE UP (ref 3.8–10.5)
WBC # FLD AUTO: 10.2 K/UL — SIGNIFICANT CHANGE UP (ref 3.8–10.5)
WBC # FLD AUTO: 9.5 K/UL — SIGNIFICANT CHANGE UP (ref 3.8–10.5)

## 2017-05-07 PROCEDURE — 71010: CPT | Mod: 26

## 2017-05-07 PROCEDURE — 93010 ELECTROCARDIOGRAM REPORT: CPT

## 2017-05-07 RX ORDER — ACETAMINOPHEN 500 MG
1000 TABLET ORAL ONCE
Qty: 0 | Refills: 0 | Status: DISCONTINUED | OUTPATIENT
Start: 2017-05-07 | End: 2017-05-07

## 2017-05-07 RX ORDER — HYDROMORPHONE HYDROCHLORIDE 2 MG/ML
0.5 INJECTION INTRAMUSCULAR; INTRAVENOUS; SUBCUTANEOUS EVERY 6 HOURS
Qty: 0 | Refills: 0 | Status: DISCONTINUED | OUTPATIENT
Start: 2017-05-07 | End: 2017-05-07

## 2017-05-07 RX ORDER — HYDROMORPHONE HYDROCHLORIDE 2 MG/ML
0.5 INJECTION INTRAMUSCULAR; INTRAVENOUS; SUBCUTANEOUS EVERY 6 HOURS
Qty: 0 | Refills: 0 | Status: DISCONTINUED | OUTPATIENT
Start: 2017-05-07 | End: 2017-05-13

## 2017-05-07 RX ORDER — ACETAMINOPHEN 500 MG
1000 TABLET ORAL ONCE
Qty: 0 | Refills: 0 | Status: COMPLETED | OUTPATIENT
Start: 2017-05-07 | End: 2017-05-07

## 2017-05-07 RX ORDER — DEXTROSE MONOHYDRATE, SODIUM CHLORIDE, AND POTASSIUM CHLORIDE 50; .745; 4.5 G/1000ML; G/1000ML; G/1000ML
1000 INJECTION, SOLUTION INTRAVENOUS
Qty: 0 | Refills: 0 | Status: DISCONTINUED | OUTPATIENT
Start: 2017-05-07 | End: 2017-05-10

## 2017-05-07 RX ADMIN — HEPARIN SODIUM 5000 UNIT(S): 5000 INJECTION INTRAVENOUS; SUBCUTANEOUS at 05:59

## 2017-05-07 RX ADMIN — HEPARIN SODIUM 5000 UNIT(S): 5000 INJECTION INTRAVENOUS; SUBCUTANEOUS at 21:09

## 2017-05-07 RX ADMIN — HYDROMORPHONE HYDROCHLORIDE 0.5 MILLIGRAM(S): 2 INJECTION INTRAMUSCULAR; INTRAVENOUS; SUBCUTANEOUS at 08:15

## 2017-05-07 RX ADMIN — Medication 1000 MILLIGRAM(S): at 16:59

## 2017-05-07 RX ADMIN — HEPARIN SODIUM 5000 UNIT(S): 5000 INJECTION INTRAVENOUS; SUBCUTANEOUS at 14:00

## 2017-05-07 RX ADMIN — HYDROMORPHONE HYDROCHLORIDE 0.5 MILLIGRAM(S): 2 INJECTION INTRAMUSCULAR; INTRAVENOUS; SUBCUTANEOUS at 07:56

## 2017-05-07 RX ADMIN — Medication 400 MILLIGRAM(S): at 00:30

## 2017-05-07 RX ADMIN — DEXTROSE MONOHYDRATE, SODIUM CHLORIDE, AND POTASSIUM CHLORIDE 80 MILLILITER(S): 50; .745; 4.5 INJECTION, SOLUTION INTRAVENOUS at 11:30

## 2017-05-07 RX ADMIN — PIPERACILLIN AND TAZOBACTAM 200 GRAM(S): 4; .5 INJECTION, POWDER, LYOPHILIZED, FOR SOLUTION INTRAVENOUS at 05:59

## 2017-05-07 RX ADMIN — HYDROMORPHONE HYDROCHLORIDE 0.5 MILLIGRAM(S): 2 INJECTION INTRAMUSCULAR; INTRAVENOUS; SUBCUTANEOUS at 01:51

## 2017-05-07 RX ADMIN — Medication 400 MILLIGRAM(S): at 15:21

## 2017-05-07 RX ADMIN — Medication 1000 MILLIGRAM(S): at 01:03

## 2017-05-07 NOTE — PROGRESS NOTE ADULT - ASSESSMENT
88 yo F with hemorrhagic ischemic bowel now s/p exlap with SBR    d/c Cartagena  d/c Zosyn   Neuro: IV Tylenol PRN; Dilaudid PRN  CV: LR @80. Normotensive goals  Pulm: extubated  GI: NPO, protonix,  NGT to LIWS  : Cart[place]a  Endo: ISS. monitor Ca   ID: zosyn (5/6--)   Lines: PIV, a-line

## 2017-05-07 NOTE — PROGRESS NOTE ADULT - SUBJECTIVE AND OBJECTIVE BOX
Progress Note    Patient seen and examined at the bedside. Patient reports feeling as if "an elephant is sitting on my chest". Denies n/v, diaphoresis, L sided jaw/arm pain. Patient reports that "I don't feel chest pain, I only feel pressure". Patient had EKG overnight which was WNL per resident read and repeat EKG at 11AM which was WNL on my wet read. At the time of complaints, patient's vitals were stable with HR in 70s, /80s, afebrile, with SpO2 96% on RA.     Urgent CXR was ordered, CBC/BMP/Troponins were drawn. CBC and BMP were stable in comparison to AM labs, but troponin was elevated to 0.079. Chief resident was made aware and cardiology was consulted. At this time, cardiology fellow recommended a repeat troponin level in 2-3 hours and indicated that he would evaluate the patient if troponins continued to uptrend.     Repeat troponins ordered for 6PM labs draws. Will follow.

## 2017-05-07 NOTE — PROGRESS NOTE ADULT - SUBJECTIVE AND OBJECTIVE BOX
O/N: Corrected calcium 9.0. Pt states preference to avoid narcotics given h/o addiction (ie, cigarettes, EtOH) thus given IV Tylenol for pain; 130am requested Dilaudid for breakthrough pain.   5/6: extubated, SDU,       88 yo F with hemorrhagic ischemic bowel now s/p exlap with SBR    Neuro: IV Tylenol PRN; Dilaudid PRN  CV: LR @80. Normotensive goals  Pulm: extubated  GI: NPO, protonix,  NGT to KALE[facility]  : Mitzi  Endo: ISS. monitor Ca   ID: zosyn (5/6--)   Lines: PIV, a-line O/N: Corrected calcium 9.0. Pt states preference to avoid narcotics given h/o addiction (ie, cigarettes, EtOH) thus given IV Tylenol for pain; 130am requested Dilaudid for breakthrough pain.   5/6: extubated, SDU,       SUBJECTIVE:  Patient seen and examined at bedside with surgery chief resident on AM rounds.     MEDICATIONS  (STANDING):  insulin lispro (HumaLOG) corrective regimen sliding scale  SubCutaneous three times a day before meals  dextrose 5%. 1000milliLiter(s) IV Continuous <Continuous>  dextrose 50% Injectable 12.5Gram(s) IV Push once  dextrose 50% Injectable 25Gram(s) IV Push once  dextrose 50% Injectable 25Gram(s) IV Push once  heparin  Injectable 5000Unit(s) SubCutaneous every 8 hours  piperacillin/tazobactam IVPB. 3.375Gram(s) IV Intermittent every 8 hours  lactated ringers. 1000milliLiter(s) IV Continuous <Continuous>    MEDICATIONS  (PRN):  ondansetron Injectable 4milliGRAM(s) IV Push every 6 hours PRN Nausea  dextrose Gel 1Dose(s) Oral once PRN Blood Glucose LESS THAN 70 milliGRAM(s)/deciliter  glucagon  Injectable 1milliGRAM(s) IntraMuscular once PRN Glucose LESS THAN 70 milligrams/deciliter  benzocaine 20% Spray 1Spray(s) Topical every 4 hours PRN sore throat  HYDROmorphone  Injectable 0.5milliGRAM(s) IV Push every 6 hours PRN Severe Pain (7 - 10)      Vital Signs Last 24 Hrs  T(C): 36.4, Max: 37.6 (05-06 @ 21:08)  T(F): 97.5, Max: 99.7 (05-06 @ 21:08)  HR: 64 (58 - 76)  BP: 119/58 (95/49 - 169/80)  BP(mean): 83 (83 - 102)  RR: 15 (10 - 18)  SpO2: 98% (96% - 100%)    PHYSICAL EXAM:      Constitutional: A&Ox3    Respiratory: non labored breathing, no respiratory distress    Cardiovascular: NSR, RRR    Gastrointestinal: Soft, MD, ATTP                 Incision: C/D/I, Dressings in place    Genitourinary: Cartagena in place    Extremities: (-) edema                  I&O's Detail    I & Os for current day (as of 07 May 2017 08:00)  =============================================  IN:    lactated ringers.: 480 ml    lactated ringers.: 440 ml    Solution: 200 ml    propofol Infusion: 10 ml    fentaNYL  Infusion: 8 ml    Total IN: 1138 ml  ---------------------------------------------  OUT:    Indwelling Catheter - Urethral: 1165 ml    Nasoenteral Tube: 300 ml    Total OUT: 1465 ml  ---------------------------------------------  Total NET: -327 ml      LABS:                        10.4   9.8   )-----------( 173      ( 06 May 2017 04:45 )             30.6     05-06    141  |  107  |  23  ----------------------------<  145<H>  4.5   |  26  |  0.81    Ca    7.5<L>      06 May 2017 04:45  Phos  5.2     05-  Mg     3.2     05-    TPro  x   /  Alb  2.1<L>  /  TBili  x   /  DBili  x   /  AST  x   /  ALT  x   /  AlkPhos  x   05-06    PT/INR - ( 06 May 2017 01:13 )   PT: 13.1 sec;   INR: 1.18          PTT - ( 06 May 2017 01:13 )  PTT:34.1 sec  Urinalysis Basic - ( 05 May 2017 19:12 )    Color: Yellow / Appearance: Clear / S.015 / pH: x  Gluc: x / Ketone: >=80 mg/dL  / Bili: NEGATIVE / Urobili: 0.2 E.U./dL   Blood: x / Protein: NEGATIVE mg/dL / Nitrite: NEGATIVE   Leuk Esterase: NEGATIVE / RBC: x / WBC x   Sq Epi: x / Non Sq Epi: x / Bacteria: x        RADIOLOGY & ADDITIONAL STUDIES:

## 2017-05-08 LAB
ANION GAP SERPL CALC-SCNC: 5 MMOL/L — LOW (ref 9–16)
BUN SERPL-MCNC: 9 MG/DL — SIGNIFICANT CHANGE UP (ref 7–23)
CALCIUM SERPL-MCNC: 7.4 MG/DL — LOW (ref 8.5–10.5)
CHLORIDE SERPL-SCNC: 107 MMOL/L — SIGNIFICANT CHANGE UP (ref 96–108)
CO2 SERPL-SCNC: 30 MMOL/L — SIGNIFICANT CHANGE UP (ref 22–31)
CREAT SERPL-MCNC: 0.69 MG/DL — SIGNIFICANT CHANGE UP (ref 0.5–1.3)
GLUCOSE SERPL-MCNC: 104 MG/DL — HIGH (ref 70–99)
HCT VFR BLD CALC: 28.5 % — LOW (ref 34.5–45)
HGB BLD-MCNC: 9.5 G/DL — LOW (ref 11.5–15.5)
MAGNESIUM SERPL-MCNC: 2.1 MG/DL — SIGNIFICANT CHANGE UP (ref 1.6–2.4)
MCHC RBC-ENTMCNC: 28.6 PG — SIGNIFICANT CHANGE UP (ref 27–34)
MCHC RBC-ENTMCNC: 33.3 G/DL — SIGNIFICANT CHANGE UP (ref 32–36)
MCV RBC AUTO: 85.8 FL — SIGNIFICANT CHANGE UP (ref 80–100)
METHGB MFR BLDA: 0 % — SIGNIFICANT CHANGE UP
PHOSPHATE SERPL-MCNC: 1.9 MG/DL — LOW (ref 2.5–4.5)
PLATELET # BLD AUTO: 174 K/UL — SIGNIFICANT CHANGE UP (ref 150–400)
POTASSIUM SERPL-MCNC: 3.7 MMOL/L — SIGNIFICANT CHANGE UP (ref 3.5–5.3)
POTASSIUM SERPL-SCNC: 3.7 MMOL/L — SIGNIFICANT CHANGE UP (ref 3.5–5.3)
RBC # BLD: 3.32 M/UL — LOW (ref 3.8–5.2)
RBC # FLD: 16 % — SIGNIFICANT CHANGE UP (ref 10.3–16.9)
SODIUM SERPL-SCNC: 142 MMOL/L — SIGNIFICANT CHANGE UP (ref 135–145)
TROPONIN I SERPL-MCNC: 0.08 NG/ML — HIGH (ref 0.01–0.04)
WBC # BLD: 7.7 K/UL — SIGNIFICANT CHANGE UP (ref 3.8–10.5)
WBC # FLD AUTO: 7.7 K/UL — SIGNIFICANT CHANGE UP (ref 3.8–10.5)

## 2017-05-08 RX ORDER — ACETAMINOPHEN 500 MG
1000 TABLET ORAL ONCE
Qty: 0 | Refills: 0 | Status: COMPLETED | OUTPATIENT
Start: 2017-05-08 | End: 2017-05-08

## 2017-05-08 RX ORDER — POTASSIUM PHOSPHATE, MONOBASIC POTASSIUM PHOSPHATE, DIBASIC 236; 224 MG/ML; MG/ML
21 INJECTION, SOLUTION INTRAVENOUS ONCE
Qty: 0 | Refills: 0 | Status: COMPLETED | OUTPATIENT
Start: 2017-05-08 | End: 2017-05-08

## 2017-05-08 RX ORDER — HYDRALAZINE HCL 50 MG
10 TABLET ORAL ONCE
Qty: 0 | Refills: 0 | Status: COMPLETED | OUTPATIENT
Start: 2017-05-08 | End: 2017-05-08

## 2017-05-08 RX ADMIN — POTASSIUM PHOSPHATE, MONOBASIC POTASSIUM PHOSPHATE, DIBASIC 64.25 MILLIMOLE(S): 236; 224 INJECTION, SOLUTION INTRAVENOUS at 16:09

## 2017-05-08 RX ADMIN — Medication 10 MILLIGRAM(S): at 12:37

## 2017-05-08 RX ADMIN — HEPARIN SODIUM 5000 UNIT(S): 5000 INJECTION INTRAVENOUS; SUBCUTANEOUS at 23:15

## 2017-05-08 RX ADMIN — Medication 1000 MILLIGRAM(S): at 05:00

## 2017-05-08 RX ADMIN — HEPARIN SODIUM 5000 UNIT(S): 5000 INJECTION INTRAVENOUS; SUBCUTANEOUS at 06:19

## 2017-05-08 RX ADMIN — Medication 400 MILLIGRAM(S): at 04:38

## 2017-05-08 RX ADMIN — HEPARIN SODIUM 5000 UNIT(S): 5000 INJECTION INTRAVENOUS; SUBCUTANEOUS at 16:04

## 2017-05-08 NOTE — PROGRESS NOTE ADULT - ASSESSMENT
89yoF with mesenteric ischemia now s/p exlap, small bowel resection (5/6)    NPO/IVF/NGT  Pain/Nausea control PRN  Troponin trend  ISS  SQH/SCDs/OOBA/IS

## 2017-05-08 NOTE — PROGRESS NOTE ADULT - SUBJECTIVE AND OBJECTIVE BOX
O/N: Ultrasound IV placed in right basilic. Pt pulled NGT partially out. Replaced. Troponins plateaued at 0.085.  5/7: Cartagena removed. Zosyn d/c-ed. NGT in place for abd distention. passed ToV. pt c/o chest pressure, CXR obtained. EKG wnl. Troponin elev to 0.079. Chief aware and cards consulted - rec'd repeat troponin in 3-4 hrs and will see patient if troponin uptrending. Pt comfortable, AVSS. DAILY PROGRESS NOTE:    INTERVAL HPI / OVERNIGHT EVENTS:  O/N: Ultrasound IV placed in right basilic. Pt pulled NGT partially out. Replaced. Troponins plateaued at 0.085.  5/7: Cartagena removed. Zosyn d/c-ed. NGT in place for abd distention. passed ToV. pt c/o chest pressure, CXR obtained. EKG wnl. Troponin elev to 0.079. Chief aware and cards consulted - rec'd repeat troponin in 3-4 hrs and will see patient if troponin uptrending. Pt comfortable, AVSS.     STATUS POST: 5/6/17: ex-lap with SBR (200cm of ischemic bowel) EBL 20mL, 2uPRBCs.     POD: 2    SUBJECTIVE:  Pt seen & examined at bedside. Pain controlled. Voiding. NPO w/ NGT in place. No complaints. No F/C, N/V, CP/SOB.    MEDICATIONS  (STANDING):  insulin lispro (HumaLOG) corrective regimen sliding scale  SubCutaneous three times a day before meals  dextrose 5%. 1000milliLiter(s) IV Continuous <Continuous>  dextrose 50% Injectable 12.5Gram(s) IV Push once  dextrose 50% Injectable 25Gram(s) IV Push once  dextrose 50% Injectable 25Gram(s) IV Push once  heparin  Injectable 5000Unit(s) SubCutaneous every 8 hours  dextrose 5% + sodium chloride 0.45% with potassium chloride 20 mEq/L 1000milliLiter(s) IV Continuous <Continuous>    MEDICATIONS  (PRN):  ondansetron Injectable 4milliGRAM(s) IV Push every 6 hours PRN Nausea  dextrose Gel 1Dose(s) Oral once PRN Blood Glucose LESS THAN 70 milliGRAM(s)/deciliter  glucagon  Injectable 1milliGRAM(s) IntraMuscular once PRN Glucose LESS THAN 70 milligrams/deciliter  benzocaine 20% Spray 1Spray(s) Topical every 4 hours PRN sore throat  HYDROmorphone  Injectable 0.5milliGRAM(s) IV Push every 6 hours PRN Severe Pain (7 - 10)      Vital Signs Last 24 Hrs  T(C): 37.1, Max: 37.1 (05-08 @ 05:45)  T(F): 98.8, Max: 98.8 (05-08 @ 05:45)  HR: 71 (66 - 84)  BP: 165/78 (133/64 - 165/78)  BP(mean): 165 (89 - 165)  RR: 16 (16 - 20)  SpO2: 95% (95% - 97%)    PHYSICAL EXAM:  Neuro: NAD, A&Ox3  Resp: No incr WOB  CV: NSR, RRR   Abd: Soft, NT, ND  : Voids  Extr: WWP, no edema  Incision(s): CDI     I&O's Detail    I & Os for current day (as of 08 May 2017 07:34)  =============================================  IN:    dextrose 5% + sodium chloride 0.45% with potassium chloride 20 mEq/L: 960 ml    Solution: 100 ml    Total IN: 1060 ml  ---------------------------------------------  OUT:    Voided: 850 ml    Indwelling Catheter - Urethral: 50 ml    Total OUT: 900 ml  ---------------------------------------------  Total NET: 160 ml      LABS:                        9.5    7.7   )-----------( 174      ( 08 May 2017 05:54 )             28.5     05-08    142  |  107  |  9   ----------------------------<  104<H>  3.7   |  30  |  0.69    Ca    7.4<L>      08 May 2017 05:53  Phos  1.9     05-08  Mg     2.1     05-08

## 2017-05-09 LAB
ALBUMIN SERPL ELPH-MCNC: 1.9 G/DL — LOW (ref 3.4–5)
ANION GAP SERPL CALC-SCNC: 4 MMOL/L — LOW (ref 9–16)
BUN SERPL-MCNC: 6 MG/DL — LOW (ref 7–23)
CALCIUM SERPL-MCNC: 7.2 MG/DL — LOW (ref 8.5–10.5)
CHLORIDE SERPL-SCNC: 108 MMOL/L — SIGNIFICANT CHANGE UP (ref 96–108)
CO2 SERPL-SCNC: 28 MMOL/L — SIGNIFICANT CHANGE UP (ref 22–31)
CREAT SERPL-MCNC: 0.65 MG/DL — SIGNIFICANT CHANGE UP (ref 0.5–1.3)
GLUCOSE SERPL-MCNC: 108 MG/DL — HIGH (ref 70–99)
HCT VFR BLD CALC: 27.8 % — LOW (ref 34.5–45)
HGB BLD-MCNC: 9.2 G/DL — LOW (ref 11.5–15.5)
LACTATE SERPL-SCNC: 0.7 MMOL/L — SIGNIFICANT CHANGE UP (ref 0.5–2)
MAGNESIUM SERPL-MCNC: 1.9 MG/DL — SIGNIFICANT CHANGE UP (ref 1.6–2.4)
MCHC RBC-ENTMCNC: 28.7 PG — SIGNIFICANT CHANGE UP (ref 27–34)
MCHC RBC-ENTMCNC: 33.1 G/DL — SIGNIFICANT CHANGE UP (ref 32–36)
MCV RBC AUTO: 86.6 FL — SIGNIFICANT CHANGE UP (ref 80–100)
PHOSPHATE SERPL-MCNC: 2.5 MG/DL — SIGNIFICANT CHANGE UP (ref 2.5–4.5)
PLATELET # BLD AUTO: 188 K/UL — SIGNIFICANT CHANGE UP (ref 150–400)
POTASSIUM SERPL-MCNC: 3.7 MMOL/L — SIGNIFICANT CHANGE UP (ref 3.5–5.3)
POTASSIUM SERPL-SCNC: 3.7 MMOL/L — SIGNIFICANT CHANGE UP (ref 3.5–5.3)
RBC # BLD: 3.21 M/UL — LOW (ref 3.8–5.2)
RBC # FLD: 16.1 % — SIGNIFICANT CHANGE UP (ref 10.3–16.9)
SODIUM SERPL-SCNC: 140 MMOL/L — SIGNIFICANT CHANGE UP (ref 135–145)
WBC # BLD: 6.1 K/UL — SIGNIFICANT CHANGE UP (ref 3.8–10.5)
WBC # FLD AUTO: 6.1 K/UL — SIGNIFICANT CHANGE UP (ref 3.8–10.5)

## 2017-05-09 PROCEDURE — 74020: CPT | Mod: 26

## 2017-05-09 RX ORDER — ELECTROLYTE SOLUTION,INJ
1 VIAL (ML) INTRAVENOUS
Qty: 0 | Refills: 0 | Status: DISCONTINUED | OUTPATIENT
Start: 2017-05-09 | End: 2017-05-10

## 2017-05-09 RX ORDER — POTASSIUM PHOSPHATE, MONOBASIC POTASSIUM PHOSPHATE, DIBASIC 236; 224 MG/ML; MG/ML
15 INJECTION, SOLUTION INTRAVENOUS ONCE
Qty: 0 | Refills: 0 | Status: COMPLETED | OUTPATIENT
Start: 2017-05-09 | End: 2017-05-09

## 2017-05-09 RX ORDER — HYDRALAZINE HCL 50 MG
10 TABLET ORAL ONCE
Qty: 0 | Refills: 0 | Status: COMPLETED | OUTPATIENT
Start: 2017-05-09 | End: 2017-05-09

## 2017-05-09 RX ORDER — I.V. FAT EMULSION 20 G/100ML
50 EMULSION INTRAVENOUS
Qty: 0 | Refills: 0 | Status: DISCONTINUED | OUTPATIENT
Start: 2017-05-09 | End: 2017-05-09

## 2017-05-09 RX ORDER — ACETAMINOPHEN 500 MG
1000 TABLET ORAL ONCE
Qty: 0 | Refills: 0 | Status: COMPLETED | OUTPATIENT
Start: 2017-05-09 | End: 2017-05-09

## 2017-05-09 RX ORDER — MAGNESIUM SULFATE 500 MG/ML
1 VIAL (ML) INJECTION ONCE
Qty: 0 | Refills: 0 | Status: COMPLETED | OUTPATIENT
Start: 2017-05-09 | End: 2017-05-09

## 2017-05-09 RX ADMIN — DEXTROSE MONOHYDRATE, SODIUM CHLORIDE, AND POTASSIUM CHLORIDE 80 MILLILITER(S): 50; .745; 4.5 INJECTION, SOLUTION INTRAVENOUS at 23:21

## 2017-05-09 RX ADMIN — Medication 400 MILLIGRAM(S): at 01:47

## 2017-05-09 RX ADMIN — Medication 10 MILLIGRAM(S): at 13:44

## 2017-05-09 RX ADMIN — POTASSIUM PHOSPHATE, MONOBASIC POTASSIUM PHOSPHATE, DIBASIC 62.5 MILLIMOLE(S): 236; 224 INJECTION, SOLUTION INTRAVENOUS at 14:12

## 2017-05-09 RX ADMIN — HEPARIN SODIUM 5000 UNIT(S): 5000 INJECTION INTRAVENOUS; SUBCUTANEOUS at 22:04

## 2017-05-09 RX ADMIN — Medication 1000 MILLIGRAM(S): at 20:10

## 2017-05-09 RX ADMIN — HEPARIN SODIUM 5000 UNIT(S): 5000 INJECTION INTRAVENOUS; SUBCUTANEOUS at 14:12

## 2017-05-09 RX ADMIN — Medication 400 MILLIGRAM(S): at 19:55

## 2017-05-09 RX ADMIN — Medication 100 GRAM(S): at 07:37

## 2017-05-09 RX ADMIN — Medication 1000 MILLIGRAM(S): at 02:15

## 2017-05-09 RX ADMIN — HEPARIN SODIUM 5000 UNIT(S): 5000 INJECTION INTRAVENOUS; SUBCUTANEOUS at 06:53

## 2017-05-09 NOTE — DIETITIAN INITIAL EVALUATION ADULT. - ENERGY NEEDS
Admission weight used as pt is within % ideal body weight.   Increased needs secondary to suspected malnutrition and post-op

## 2017-05-09 NOTE — CHART NOTE - NSCHARTNOTEFT_GEN_A_CORE
Upon Nutritional Assessment by the Registered Dietitian your patient was determined to meet criteria / has evidence of the following diagnosis/diagnoses:          [ ]  Mild Protein Calorie Malnutrition        [ ]  Moderate Protein Calorie Malnutrition        [x ] Severe Protein Calorie Malnutrition        [ ] Unspecified Protein Calorie Malnutrition        [ ] Underweight / BMI <19        [ ] Morbid Obesity / BMI > 40      Findings as based on:  •  Comprehensive nutrition assessment and consultation  ~20% weight loss x1 year secondary to inadequate oral intake       Treatment:    The following diet has been recommended:  1. As medically feasible, rec diet advancement to Clear Liquids  2. As tolerated, continue diet advancement to Regular, soft, low fiber  3. Pending diet advancement, rec adding Ensure Enlive BID (700kcal, 40g pro)  4. Rec Multivitamin daily     PROVIDER Section:     By signing this assessment you are acknowledging and agree with the diagnosis/diagnoses assigned by the Registered Dietitian    Comments:

## 2017-05-09 NOTE — CONSULT NOTE ADULT - SUBJECTIVE AND OBJECTIVE BOX
Vascular Access Service Consult Note    89yFemaleHEALTH ISSUES - PROBLEM Dx:  Hyperparathyroidism: Hyperparathyroidism  Abdominal pain: Abdominal pain             Diagnosis: short gut syndrome    Indications for Vascular Access (Check all that apply)  [  ]  Antibiotic Therapy       Antibiotic Prescribed:                                                                             Expected Duration of Therapy:               [  ]  IV Hydration  [  X]  Total Parenteral Nutrition  [  ]  Chemotherapy  [  ]  Difficult Venous Access  [  ]  CVP monitoring  [  ]  Medications with high potential for tissue necrosis on extravasation  [  ]  Other    Screening (Check all that apply)  Previous Radiation to chest  [  ] Yes      [ X ]  No  Breast Cancer                          [  ] Left     [  ]  Right    [ X ]  No  Lymph Node Dissection         [  ] Left     [  ]  Right    [  X]  No  Pacemaker or ICD                   [  ] Left     [  ]  Right    [ X ]  No  Upper Extremity DVT             [  ] Left     [  ]  Right    [X  ]  No  Chronic Kidney Disease         [  ]  Yes     [ X ]  No  Hemodialysis                           [  ]  Yes     [X  ]  No  AV Fistula/ Graft                     [  ]  Left    [  ]  Right    [ X ]  No  Temp>101F in past 24 H       [  ]  Yes     [X  ]  No  H/O PICC/Midline                   [  ]  Yes     [X  ]  No    Lab data:                        9.2    6.1   )-----------( 188      ( 09 May 2017 05:48 )             27.8     05-09    140  |  108  |  6<L>  ----------------------------<  108<H>  3.7   |  28  |  0.65    Ca    7.2<L>      09 May 2017 05:47  Phos  2.5     05-09  Mg     1.9     05-09    TPro  x   /  Alb  1.9<L>  /  TBili  x   /  DBili  x   /  AST  x   /  ALT  x   /  AlkPhos  x   05-09              I have reviewed the chart, interviewed and examined the patient and determined that this patient:  [X  ] Is a candidate for a PICC line  [  ] Is a candidate for a Midline  [  ] Is not a candidate for vascular access device (reason)    Lumens:    [  ] Single  [X  ] Double

## 2017-05-09 NOTE — DIETITIAN INITIAL EVALUATION ADULT. - NS AS NUTRI INTERV MEALS SNACK
As medically feasible, recommend diet advancement to Clear Liquids.  As tolerated, continue diet advancement to Regular, soft, low fiber

## 2017-05-09 NOTE — PHYSICAL THERAPY INITIAL EVALUATION ADULT - ADDITIONAL COMMENTS
Pt lives in elevator access apartment with . Has no HHA and was previously independent in the community. Reports that she had recently begun borrowing husbands rollator due to a new history of 2 falls. Pt does not own her own RW.

## 2017-05-09 NOTE — DIETITIAN INITIAL EVALUATION ADULT. - OTHER INFO
Pt admitted with abdominal pain; mesenteric ischemia s/p ex lap and small bowel resection.  NGT has been removed.  Pt remains NPO at time of assessment.  Pt complains of feeling very hungry.  Pt remains with slight abdominal pain and distention; (-) flatus.  Pain is being managed.  NKFA.  Skin: surgical incision.

## 2017-05-09 NOTE — PROGRESS NOTE ADULT - ASSESSMENT
89 yo F with hemorrhagic ischemic bowel now s/p exlap with SBR POD3    NPO/IVF/NGT  Pain/Nausea control PRN  F/U ROBF 88 yo F with hemorrhagic ischemic bowel now s/p exlap with SBR POD3    NPO/IVF  Pain/Nausea control PRN  F/U ROBF  AXR

## 2017-05-09 NOTE — PROGRESS NOTE ADULT - SUBJECTIVE AND OBJECTIVE BOX
O/N: Increased abdominal pain and distention, helped by Tylenol. AXR ordered.  5/8: troponins downtrending. Pending Echo DAILY PROGRESS NOTE:    INTERVAL HPI / OVERNIGHT EVENTS:  O/N: Increased abdominal pain and distention, helped by Tylenol. AXR ordered.  5/8: troponins downtrending. Pending Echo    STATUS POST: 5/6/17: ex-lap with SBR (200cm of ischemic bowel) EBL 20mL, 2uPRBCs.     POD: 3    SUBJECTIVE:  Pt seen & examined at bedside. Pain controlled. OOBTC on AM rounds. Worked w/ PT yesterday. C/o abd distension/bloating. -flatus/-BM. No F/C, N/V, CP/SOB.    MEDICATIONS  (STANDING):  insulin lispro (HumaLOG) corrective regimen sliding scale  SubCutaneous Before meals and at bedtime  dextrose 5%. 1000milliLiter(s) IV Continuous <Continuous>  dextrose 50% Injectable 12.5Gram(s) IV Push once  dextrose 50% Injectable 25Gram(s) IV Push once  dextrose 50% Injectable 25Gram(s) IV Push once  heparin  Injectable 5000Unit(s) SubCutaneous every 8 hours  dextrose 5% + sodium chloride 0.45% with potassium chloride 20 mEq/L 1000milliLiter(s) IV Continuous <Continuous>    MEDICATIONS  (PRN):  ondansetron Injectable 4milliGRAM(s) IV Push every 6 hours PRN Nausea  dextrose Gel 1Dose(s) Oral once PRN Blood Glucose LESS THAN 70 milliGRAM(s)/deciliter  glucagon  Injectable 1milliGRAM(s) IntraMuscular once PRN Glucose LESS THAN 70 milligrams/deciliter  benzocaine 20% Spray 1Spray(s) Topical every 4 hours PRN sore throat  HYDROmorphone  Injectable 0.5milliGRAM(s) IV Push every 6 hours PRN Breakthrough Pain    Vital Signs Last 24 Hrs  T(C): 36.6, Max: 37 (05-08 @ 22:22)  T(F): 97.9, Max: 98.6 (05-08 @ 22:22)  HR: 56 (56 - 78)  BP: 162/71 (150/63 - 209/92)  BP(mean): 102 (91 - 132)  RR: 16 (16 - 17)  SpO2: 97% (95% - 97%)    PHYSICAL EXAM:  Neuro: NAD, A&Ox3  Resp: No incr WOB  CV: NSR, RRR   Abd: Softly distended, mildly TTP   : Voids  Extr: WWP, no edema  Incision(s): CDI       I&O's Detail  I & Os for 24h ending 09 May 2017 07:00  =============================================  IN:    dextrose 5% + sodium chloride 0.45% with potassium chloride 20 mEq/L: 800 ml    Total IN: 800 ml  ---------------------------------------------  OUT:    Voided: 400 ml    Total OUT: 400 ml  ---------------------------------------------  Total NET: 400 ml    I & Os for current day (as of 09 May 2017 07:16)  =============================================  IN:    dextrose 5% + sodium chloride 0.45% with potassium chloride 20 mEq/L: 80 ml    Total IN: 80 ml  ---------------------------------------------  OUT:    Voided: 450 ml    Total OUT: 450 ml  ---------------------------------------------  Total NET: -370 ml      LABS:                        9.2    6.1   )-----------( 188      ( 09 May 2017 05:48 )             27.8     05-09    140  |  108  |  6<L>  ----------------------------<  108<H>  3.7   |  28  |  0.65    Ca    7.2<L>      09 May 2017 05:47  Phos  2.5     05-09  Mg     1.9     05-09

## 2017-05-10 LAB
ANION GAP SERPL CALC-SCNC: 8 MMOL/L — LOW (ref 9–16)
BUN SERPL-MCNC: 9 MG/DL — SIGNIFICANT CHANGE UP (ref 7–23)
CALCIUM SERPL-MCNC: 7.8 MG/DL — LOW (ref 8.5–10.5)
CHLORIDE SERPL-SCNC: 109 MMOL/L — HIGH (ref 96–108)
CO2 SERPL-SCNC: 24 MMOL/L — SIGNIFICANT CHANGE UP (ref 22–31)
CREAT SERPL-MCNC: 0.61 MG/DL — SIGNIFICANT CHANGE UP (ref 0.5–1.3)
GLUCOSE SERPL-MCNC: 88 MG/DL — SIGNIFICANT CHANGE UP (ref 70–99)
HCT VFR BLD CALC: 25.6 % — LOW (ref 34.5–45)
HGB BLD-MCNC: 8.4 G/DL — LOW (ref 11.5–15.5)
MAGNESIUM SERPL-MCNC: 2.2 MG/DL — SIGNIFICANT CHANGE UP (ref 1.6–2.4)
MCHC RBC-ENTMCNC: 27.6 PG — SIGNIFICANT CHANGE UP (ref 27–34)
MCHC RBC-ENTMCNC: 32.8 G/DL — SIGNIFICANT CHANGE UP (ref 32–36)
MCV RBC AUTO: 84.2 FL — SIGNIFICANT CHANGE UP (ref 80–100)
PHOSPHATE SERPL-MCNC: 2.8 MG/DL — SIGNIFICANT CHANGE UP (ref 2.5–4.5)
PLATELET # BLD AUTO: 242 K/UL — SIGNIFICANT CHANGE UP (ref 150–400)
POTASSIUM SERPL-MCNC: 3.6 MMOL/L — SIGNIFICANT CHANGE UP (ref 3.5–5.3)
POTASSIUM SERPL-SCNC: 3.6 MMOL/L — SIGNIFICANT CHANGE UP (ref 3.5–5.3)
RBC # BLD: 3.04 M/UL — LOW (ref 3.8–5.2)
RBC # FLD: 16.6 % — SIGNIFICANT CHANGE UP (ref 10.3–16.9)
SODIUM SERPL-SCNC: 141 MMOL/L — SIGNIFICANT CHANGE UP (ref 135–145)
WBC # BLD: 4.7 K/UL — SIGNIFICANT CHANGE UP (ref 3.8–10.5)
WBC # FLD AUTO: 4.7 K/UL — SIGNIFICANT CHANGE UP (ref 3.8–10.5)

## 2017-05-10 PROCEDURE — 71010: CPT | Mod: 26

## 2017-05-10 PROCEDURE — 36569 INSJ PICC 5 YR+ W/O IMAGING: CPT

## 2017-05-10 PROCEDURE — 93306 TTE W/DOPPLER COMPLETE: CPT | Mod: 26

## 2017-05-10 RX ORDER — SODIUM CHLORIDE 9 MG/ML
10 INJECTION INTRAMUSCULAR; INTRAVENOUS; SUBCUTANEOUS
Qty: 0 | Refills: 0 | Status: DISCONTINUED | OUTPATIENT
Start: 2017-05-10 | End: 2017-05-13

## 2017-05-10 RX ORDER — ELECTROLYTE SOLUTION,INJ
1 VIAL (ML) INTRAVENOUS
Qty: 0 | Refills: 0 | Status: DISCONTINUED | OUTPATIENT
Start: 2017-05-10 | End: 2017-05-11

## 2017-05-10 RX ORDER — SODIUM CHLORIDE 9 MG/ML
20 INJECTION INTRAMUSCULAR; INTRAVENOUS; SUBCUTANEOUS ONCE
Qty: 0 | Refills: 0 | Status: DISCONTINUED | OUTPATIENT
Start: 2017-05-10 | End: 2017-05-13

## 2017-05-10 RX ADMIN — HEPARIN SODIUM 5000 UNIT(S): 5000 INJECTION INTRAVENOUS; SUBCUTANEOUS at 06:15

## 2017-05-10 RX ADMIN — Medication 1 EACH: at 20:18

## 2017-05-10 RX ADMIN — HEPARIN SODIUM 5000 UNIT(S): 5000 INJECTION INTRAVENOUS; SUBCUTANEOUS at 15:59

## 2017-05-10 RX ADMIN — HEPARIN SODIUM 5000 UNIT(S): 5000 INJECTION INTRAVENOUS; SUBCUTANEOUS at 23:31

## 2017-05-10 NOTE — PROGRESS NOTE ADULT - ASSESSMENT
89yoF with mesenteric ischemia now s/p exlap and small bowel resection (5/6)    CLD  Pain/Nausea control PRN  ISS/SQH/SCDs/OOBA/IS  F/U ROBF

## 2017-05-10 NOTE — PROCEDURE NOTE - NSPROCDETAILS_GEN_ALL_CORE
ultrasound guidance/sterile dressing applied/location identified, draped/prepped, sterile technique used/supine position/sterile technique, catheter placed

## 2017-05-10 NOTE — PROGRESS NOTE ADULT - SUBJECTIVE AND OBJECTIVE BOX
O/N: Called to bedside multiple times because RN reported pt "extremely anxious." Multiple discussions had with patient regarding IVF, diet advancement, AXR, bowel resection, TPN and PICC lines, family concerns. Throughout, pt stable with some abd pain though refusing Tylenol because she believes it gives her palpitations. VSS though HTNsive when upset.  5/9: AXR flat/upright for distention/tenderness. Hypertensive to 200s; given hydral 10mg w/ improvement to SBP 170s. IR to place PICC for TPN today. PICC placement procedure & TPN d/w pt & son; per son, pt signs consent for herself and does not have HCP. All expressed agreement with plan but pt later refused PICC. Will re-attempt tomorrow AM. +Flatus thus advanced to CLD. PT recs: home PT & home assistance. O/N: Called to bedside multiple times because RN reported pt "extremely anxious." Multiple discussions had with patient regarding IVF, diet advancement, AXR, bowel resection, TPN and PICC lines, family concerns. Throughout, pt stable with some abd pain though refusing Tylenol because she believes it gives her palpitations. VSS though HTNsive when upset.  5/9: AXR flat/upright for distention/tenderness. Hypertensive to 200s; given hydral 10mg w/ improvement to SBP 170s. IR to place PICC for TPN today. PICC placement procedure & TPN d/w pt & son; per son, pt signs consent for herself and does not have HCP. All expressed agreement with plan but pt later refused PICC. Will re-attempt tomorrow AM. +Flatus thus advanced to CLD. PT recs: home PT & home assistance.     STATUS POST:  5/6/17: ex-lap with SBR (200cm of ischemic bowel    POST OPERATIVE DAY #: 4    SUBJECTIVE:  Flatus: [ ] YES [x ] NO             Bowel Movement: [ ] YES [x ] NO  Pain (0-10):            Pain Control Adequate: x[ ] YES [ ] NO  Nausea: [ ] YES [ x] NO            Vomiting: [ ] YES [x ] NO  Diarrhea: [ ] YES [ x] NO         Constipation: [ ] YES [ x] NO     Chest Pain: [ ] YES [ x] NO    SOB:  [ ] YES [ x] NO    MEDICATIONS  (STANDING):  insulin lispro (HumaLOG) corrective regimen sliding scale  SubCutaneous Before meals and at bedtime  dextrose 5%. 1000milliLiter(s) IV Continuous <Continuous>  dextrose 50% Injectable 12.5Gram(s) IV Push once  dextrose 50% Injectable 25Gram(s) IV Push once  dextrose 50% Injectable 25Gram(s) IV Push once  heparin  Injectable 5000Unit(s) SubCutaneous every 8 hours  dextrose 5% + sodium chloride 0.45% with potassium chloride 20 mEq/L 1000milliLiter(s) IV Continuous <Continuous>  fat emulsion 20% Infusion 50Gram(s) IV Continuous <Continuous>  Parenteral Nutrition - Adult 1Each TPN Continuous <Continuous>    MEDICATIONS  (PRN):  ondansetron Injectable 4milliGRAM(s) IV Push every 6 hours PRN Nausea  dextrose Gel 1Dose(s) Oral once PRN Blood Glucose LESS THAN 70 milliGRAM(s)/deciliter  glucagon  Injectable 1milliGRAM(s) IntraMuscular once PRN Glucose LESS THAN 70 milligrams/deciliter  benzocaine 20% Spray 1Spray(s) Topical every 4 hours PRN sore throat  HYDROmorphone  Injectable 0.5milliGRAM(s) IV Push every 6 hours PRN Breakthrough Pain      Vital Signs Last 24 Hrs  T(C): 36.7, Max: 37.1 (05-09 @ 21:53)  T(F): 98.1, Max: 98.8 (05-09 @ 21:53)  HR: 84 (70 - 90)  BP: 175/89 (151/67 - 193/77)  BP(mean): 112 (98 - 112)  RR: 20 (16 - 20)  SpO2: 97% (95% - 97%)    PHYSICAL EXAM:      Constitutional: A&Ox3      Respiratory: non labored breathing, no respiratory distress    Cardiovascular: NSR, RRR    Gastrointestinal: soft, non distended, no ttp                 Incision: CDI    Genitourinary: voiding     Extremities: (-) edema          I&O's Detail    I & Os for current day (as of 10 May 2017 07:32)  =============================================  IN:    dextrose 5% + sodium chloride 0.45% with potassium chloride 20 mEq/L: 320 ml    Solution: 100 ml    Total IN: 420 ml  ---------------------------------------------  OUT:    Voided: 1950 ml    Total OUT: 1950 ml  ---------------------------------------------  Total NET: -1530 ml      LABS:                        8.4    4.7   )-----------( 242      ( 10 May 2017 05:56 )             25.6     05-10    141  |  109<H>  |  9   ----------------------------<  88  3.6   |  24  |  0.61    Ca    7.8<L>      10 May 2017 05:58  Phos  2.8     05-10  Mg     2.2     05-10    TPro  x   /  Alb  1.9<L>  /  TBili  x   /  DBili  x   /  AST  x   /  ALT  x   /  AlkPhos  x   05-09          RADIOLOGY & ADDITIONAL STUDIES:

## 2017-05-11 LAB
ANION GAP SERPL CALC-SCNC: 9 MMOL/L — SIGNIFICANT CHANGE UP (ref 9–16)
BUN SERPL-MCNC: 11 MG/DL — SIGNIFICANT CHANGE UP (ref 7–23)
CALCIUM SERPL-MCNC: 8.2 MG/DL — LOW (ref 8.5–10.5)
CHLORIDE SERPL-SCNC: 104 MMOL/L — SIGNIFICANT CHANGE UP (ref 96–108)
CO2 SERPL-SCNC: 25 MMOL/L — SIGNIFICANT CHANGE UP (ref 22–31)
CREAT SERPL-MCNC: 0.63 MG/DL — SIGNIFICANT CHANGE UP (ref 0.5–1.3)
GLUCOSE SERPL-MCNC: 103 MG/DL — HIGH (ref 70–99)
HCT VFR BLD CALC: 28.6 % — LOW (ref 34.5–45)
HGB BLD-MCNC: 9.6 G/DL — LOW (ref 11.5–15.5)
MAGNESIUM SERPL-MCNC: 2.2 MG/DL — SIGNIFICANT CHANGE UP (ref 1.6–2.4)
MCHC RBC-ENTMCNC: 28.4 PG — SIGNIFICANT CHANGE UP (ref 27–34)
MCHC RBC-ENTMCNC: 33.6 G/DL — SIGNIFICANT CHANGE UP (ref 32–36)
MCV RBC AUTO: 84.6 FL — SIGNIFICANT CHANGE UP (ref 80–100)
PHOSPHATE SERPL-MCNC: 3.1 MG/DL — SIGNIFICANT CHANGE UP (ref 2.5–4.5)
PLATELET # BLD AUTO: 265 K/UL — SIGNIFICANT CHANGE UP (ref 150–400)
POTASSIUM SERPL-MCNC: 3.4 MMOL/L — LOW (ref 3.5–5.3)
POTASSIUM SERPL-SCNC: 3.4 MMOL/L — LOW (ref 3.5–5.3)
RBC # BLD: 3.38 M/UL — LOW (ref 3.8–5.2)
RBC # FLD: 16.6 % — SIGNIFICANT CHANGE UP (ref 10.3–16.9)
SODIUM SERPL-SCNC: 138 MMOL/L — SIGNIFICANT CHANGE UP (ref 135–145)
WBC # BLD: 5.2 K/UL — SIGNIFICANT CHANGE UP (ref 3.8–10.5)
WBC # FLD AUTO: 5.2 K/UL — SIGNIFICANT CHANGE UP (ref 3.8–10.5)

## 2017-05-11 RX ORDER — ELECTROLYTE SOLUTION,INJ
1 VIAL (ML) INTRAVENOUS
Qty: 0 | Refills: 0 | Status: DISCONTINUED | OUTPATIENT
Start: 2017-05-11 | End: 2017-05-11

## 2017-05-11 RX ORDER — ACETAMINOPHEN 500 MG
650 TABLET ORAL EVERY 6 HOURS
Qty: 0 | Refills: 0 | Status: DISCONTINUED | OUTPATIENT
Start: 2017-05-11 | End: 2017-05-13

## 2017-05-11 RX ORDER — I.V. FAT EMULSION 20 G/100ML
50 EMULSION INTRAVENOUS
Qty: 0 | Refills: 0 | Status: DISCONTINUED | OUTPATIENT
Start: 2017-05-11 | End: 2017-05-12

## 2017-05-11 RX ORDER — POTASSIUM CHLORIDE 20 MEQ
40 PACKET (EA) ORAL ONCE
Qty: 0 | Refills: 0 | Status: COMPLETED | OUTPATIENT
Start: 2017-05-11 | End: 2017-05-11

## 2017-05-11 RX ORDER — ELECTROLYTE SOLUTION,INJ
1 VIAL (ML) INTRAVENOUS
Qty: 0 | Refills: 0 | Status: DISCONTINUED | OUTPATIENT
Start: 2017-05-11 | End: 2017-05-12

## 2017-05-11 RX ADMIN — HEPARIN SODIUM 5000 UNIT(S): 5000 INJECTION INTRAVENOUS; SUBCUTANEOUS at 23:41

## 2017-05-11 RX ADMIN — SIMETHICONE 80 MILLIGRAM(S): 80 TABLET, CHEWABLE ORAL at 23:30

## 2017-05-11 RX ADMIN — Medication 40 MILLIEQUIVALENT(S): at 12:57

## 2017-05-11 RX ADMIN — HEPARIN SODIUM 5000 UNIT(S): 5000 INJECTION INTRAVENOUS; SUBCUTANEOUS at 14:27

## 2017-05-11 RX ADMIN — HEPARIN SODIUM 5000 UNIT(S): 5000 INJECTION INTRAVENOUS; SUBCUTANEOUS at 06:35

## 2017-05-11 RX ADMIN — Medication 650 MILLIGRAM(S): at 22:28

## 2017-05-11 RX ADMIN — Medication 50 EACH: at 18:38

## 2017-05-11 RX ADMIN — Medication 650 MILLIGRAM(S): at 21:28

## 2017-05-11 NOTE — PROGRESS NOTE ADULT - ASSESSMENT
89yoF with mesenteric ischemia now s/p exlap and small bowel resection (5/6)    FLD  Pain/Nausea control PRN  ISS/SQH/SCDs/OOBA/IS  F/U BF 89yoF with mesenteric ischemia now s/p exlap and small bowel resection (5/6)    FLD- advance diet to soft  Pain/Nausea control PRN  ISS/SQH/SCDs/OOBA/IS  F/U BF  TPN

## 2017-05-11 NOTE — PROGRESS NOTE ADULT - SUBJECTIVE AND OBJECTIVE BOX
O/N: PATY. Echo shows EF 60-65%, moderate AS  5/10: PICC placed in left bascilic vein; TPN to start this evening. Stepped down. +BM w/ small amt of blood w/ wiping; + flatus; stepped down O/N: PATY. Echo shows EF 60-65%, moderate AS  5/10: PICC placed in left bascilic vein; TPN to start this evening. Stepped down. +BM w/ small amt of blood w/ wiping; + flatus; stepped down      STATUS POST:  5/6/17: ex-lap with SBR (200cm of ischemic bowel)    POST OPERATIVE DAY #: 5    SUBJECTIVE:  Flatus: x[ ] YES [ ] NO             Bowel Movement: [x  ] YES [ ] NO  Pain (0-10):            Pain Control Adequate: [ x] YES [ ] NO  Nausea: [ ] YES [ x] NO            Vomiting: [ ] YES [ x] NO  Diarrhea: [ ] YES [ x] NO         Constipation: [ ] YES [x ] NO     Chest Pain: [ ] YES [x ] NO    SOB:  [ ] YES  [x] NO    MEDICATIONS  (STANDING):  insulin lispro (HumaLOG) corrective regimen sliding scale  SubCutaneous Before meals and at bedtime  dextrose 5%. 1000milliLiter(s) IV Continuous <Continuous>  dextrose 50% Injectable 12.5Gram(s) IV Push once  dextrose 50% Injectable 25Gram(s) IV Push once  dextrose 50% Injectable 25Gram(s) IV Push once  heparin  Injectable 5000Unit(s) SubCutaneous every 8 hours  sodium chloride 0.9% lock flush 20milliLiter(s) IV Push once  Parenteral Nutrition - Adult 1Each TPN Continuous <Continuous>    MEDICATIONS  (PRN):  ondansetron Injectable 4milliGRAM(s) IV Push every 6 hours PRN Nausea  dextrose Gel 1Dose(s) Oral once PRN Blood Glucose LESS THAN 70 milliGRAM(s)/deciliter  glucagon  Injectable 1milliGRAM(s) IntraMuscular once PRN Glucose LESS THAN 70 milligrams/deciliter  benzocaine 20% Spray 1Spray(s) Topical every 4 hours PRN sore throat  HYDROmorphone  Injectable 0.5milliGRAM(s) IV Push every 6 hours PRN Breakthrough Pain  sodium chloride 0.9% lock flush 10milliLiter(s) IV Push every 1 hour PRN After each medication administration      Vital Signs Last 24 Hrs  T(C): 36.1, Max: 37.1 (05-10 @ 16:50)  T(F): 97, Max: 98.7 (05-10 @ 16:50)  HR: 78 (76 - 87)  BP: 167/79 (152/71 - 182/80)  BP(mean): 102 (102 - 115)  RR: 16 (16 - 17)  SpO2: 96% (95% - 97%)    PHYSICAL EXAM:      Constitutional: A&Ox3    Respiratory: non labored breathing, no respiratory distress    Cardiovascular: NSR, RRR    Gastrointestinal: soft, non distended, no ttp                 Incision: CDI    Genitourinary: voiding     Extremities: (-) edema            I&O's Detail    I & Os for current day (as of 11 May 2017 07:34)  =============================================  IN:    TPN (Total Parenteral Nutrition): 662.5 ml    Oral Fluid: 280 ml    Total IN: 942.5 ml  ---------------------------------------------  OUT:    Voided: 2050 ml    Total OUT: 2050 ml  ---------------------------------------------  Total NET: -1107.5 ml      LABS:                        9.6    5.2   )-----------( 265      ( 11 May 2017 06:42 )             28.6     05-10    141  |  109<H>  |  9   ----------------------------<  88  3.6   |  24  |  0.61    Ca    7.8<L>      10 May 2017 05:58  Phos  2.8     05-10  Mg     2.2     05-10            RADIOLOGY & ADDITIONAL STUDIES:

## 2017-05-12 ENCOUNTER — TRANSCRIPTION ENCOUNTER (OUTPATIENT)
Age: 82
End: 2017-05-12

## 2017-05-12 LAB
ANION GAP SERPL CALC-SCNC: 8 MMOL/L — LOW (ref 9–16)
BUN SERPL-MCNC: 19 MG/DL — SIGNIFICANT CHANGE UP (ref 7–23)
CALCIUM SERPL-MCNC: 8.2 MG/DL — LOW (ref 8.5–10.5)
CHLORIDE SERPL-SCNC: 104 MMOL/L — SIGNIFICANT CHANGE UP (ref 96–108)
CO2 SERPL-SCNC: 26 MMOL/L — SIGNIFICANT CHANGE UP (ref 22–31)
CREAT SERPL-MCNC: 0.67 MG/DL — SIGNIFICANT CHANGE UP (ref 0.5–1.3)
GLUCOSE SERPL-MCNC: 96 MG/DL — SIGNIFICANT CHANGE UP (ref 70–99)
HCT VFR BLD CALC: 29 % — LOW (ref 34.5–45)
HGB BLD-MCNC: 9.6 G/DL — LOW (ref 11.5–15.5)
MAGNESIUM SERPL-MCNC: 2.2 MG/DL — SIGNIFICANT CHANGE UP (ref 1.6–2.6)
MCHC RBC-ENTMCNC: 27.9 PG — SIGNIFICANT CHANGE UP (ref 27–34)
MCHC RBC-ENTMCNC: 33.1 G/DL — SIGNIFICANT CHANGE UP (ref 32–36)
MCV RBC AUTO: 84.3 FL — SIGNIFICANT CHANGE UP (ref 80–100)
PHOSPHATE SERPL-MCNC: 3.3 MG/DL — SIGNIFICANT CHANGE UP (ref 2.5–4.5)
PLATELET # BLD AUTO: 290 K/UL — SIGNIFICANT CHANGE UP (ref 150–400)
POTASSIUM SERPL-MCNC: 3.6 MMOL/L — SIGNIFICANT CHANGE UP (ref 3.5–5.3)
POTASSIUM SERPL-SCNC: 3.6 MMOL/L — SIGNIFICANT CHANGE UP (ref 3.5–5.3)
RBC # BLD: 3.44 M/UL — LOW (ref 3.8–5.2)
RBC # FLD: 16.7 % — SIGNIFICANT CHANGE UP (ref 10.3–16.9)
SODIUM SERPL-SCNC: 138 MMOL/L — SIGNIFICANT CHANGE UP (ref 135–145)
WBC # BLD: 6.1 K/UL — SIGNIFICANT CHANGE UP (ref 3.8–10.5)
WBC # FLD AUTO: 6.1 K/UL — SIGNIFICANT CHANGE UP (ref 3.8–10.5)

## 2017-05-12 RX ORDER — BACITRACIN ZINC 500 UNIT/G
1 OINTMENT IN PACKET (EA) TOPICAL THREE TIMES A DAY
Qty: 0 | Refills: 0 | Status: DISCONTINUED | OUTPATIENT
Start: 2017-05-12 | End: 2017-05-13

## 2017-05-12 RX ORDER — SIMETHICONE 80 MG/1
80 TABLET, CHEWABLE ORAL ONCE
Qty: 0 | Refills: 0 | Status: COMPLETED | OUTPATIENT
Start: 2017-05-12 | End: 2017-05-11

## 2017-05-12 RX ADMIN — HEPARIN SODIUM 5000 UNIT(S): 5000 INJECTION INTRAVENOUS; SUBCUTANEOUS at 07:11

## 2017-05-12 RX ADMIN — Medication 650 MILLIGRAM(S): at 04:49

## 2017-05-12 RX ADMIN — HEPARIN SODIUM 5000 UNIT(S): 5000 INJECTION INTRAVENOUS; SUBCUTANEOUS at 14:50

## 2017-05-12 RX ADMIN — Medication 1 APPLICATION(S): at 14:50

## 2017-05-12 RX ADMIN — I.V. FAT EMULSION 20.83 GRAM(S): 20 EMULSION INTRAVENOUS at 00:14

## 2017-05-12 RX ADMIN — HEPARIN SODIUM 5000 UNIT(S): 5000 INJECTION INTRAVENOUS; SUBCUTANEOUS at 22:58

## 2017-05-12 RX ADMIN — Medication 1 APPLICATION(S): at 23:00

## 2017-05-12 RX ADMIN — Medication 650 MILLIGRAM(S): at 05:49

## 2017-05-12 NOTE — PROGRESS NOTE ADULT - ASSESSMENT
89yoF with mesenteric ischemia now s/p exlap and small bowel resection (5/6)    soft diet with ensure supplementation  Pain/Nausea control PRN  ISS/SQH/SCDs/OOBA/IS  F/U BF

## 2017-05-12 NOTE — DISCHARGE NOTE ADULT - HOSPITAL COURSE
89F w/ questionable PMH of dementia, hyperparathyroidism, and recent dental procedure presented to [facility] ED w/ complaints of severe abdominal pain with associated nausea/vomiting. Patient reports no abdominal surgeries in the past. Reports that she never experienced anything like this before. Denies hx of Afib. Denies any pertinent cardiac history. Denies f/c at home. Reports having to strain in order to move her bowel today. Admission CT A/P revelaed Findings suspicious for ischemia of distal small bowel with marked surrounding mesenteric edema. No pneumatosis or free air. On 5/6, pt underwent an exploratory laparotomy w/ SBR ( 200cm of ischemic bowel). Pt was started on TPN postoperatively. Diet was advanced as tolerated on POD5. Rest of hospitalization remained uncomplicated. Pain was well controlled. Pt was evaluated by physical therapy and recommended home PT. At the time of discharge, pt's vital signs are stable and labs WNL.

## 2017-05-12 NOTE — DISCHARGE NOTE ADULT - CARE PLAN
Principal Discharge DX:	Mesenteric ischemia  Goal:	tolerate diet.  Instructions for follow-up, activity and diet:	Continue a regular diet.  Do not engage in any strenuous physical activity for 4-6 weeks. Do not lift >20lbs.  You will be discharged on oral pain medication.   Follow up with Dr. Sebas Barragan in 1-2 weeks. call the office to schedule an appointment.  Return to the ED for any worsening abdominal pain, fever>101F/chills, nausea/vomiting, shortness of breath, or chest pain. Principal Discharge DX:	Mesenteric ischemia  Goal:	tolerate diet.  Instructions for follow-up, activity and diet:	Continue a regular diet.  Do not engage in any strenuous physical activity for 4-6 weeks. Do not lift >20lbs.  You may take extra strength tylenol for pain  Follow up with Dr. Sebas Barragan in 1-2 weeks. call the office to schedule an appointment.  Return to the ED for any worsening abdominal pain, fever>101F/chills, nausea/vomiting, shortness of breath, or chest pain.

## 2017-05-12 NOTE — DISCHARGE NOTE ADULT - PATIENT PORTAL LINK FT
“You can access the Follow[employer] Patient Portal, offered by [facility], by registering with the following website: [email or web address]”

## 2017-05-12 NOTE — PROGRESS NOTE ADULT - SUBJECTIVE AND OBJECTIVE BOX
O/N: Requesting not to have narcotics, would prefer tylenol/advil  5/11: advanced to regular diet and tolerating; calorie count ordered O/N: Requesting not to have narcotics, would prefer tylenol/advil  5/11: advanced to regular diet and tolerating; calorie count ordered       STATUS POST:  ex-lap with SBR (200cm of ischemic bowel)     POST OPERATIVE DAY #: 6    SUBJECTIVE:  Flatus: [ x] YES [ ] NO             Bowel Movement: [x ] YES [ ] NO  Pain (0-10):            Pain Control Adequate: [ ] YES [ x] NO  Nausea: [ ] YES [x ] NO            Vomiting: [ ] YES [x ] NO  Diarrhea: [ ] YES [ x] NO         Constipation: [ ] YES [ x] NO     Chest Pain: [ ] YES [ x] NO    SOB:  [ ] YES [ x] NO    MEDICATIONS  (STANDING):  insulin lispro (HumaLOG) corrective regimen sliding scale  SubCutaneous Before meals and at bedtime  dextrose 5%. 1000milliLiter(s) IV Continuous <Continuous>  dextrose 50% Injectable 12.5Gram(s) IV Push once  dextrose 50% Injectable 25Gram(s) IV Push once  dextrose 50% Injectable 25Gram(s) IV Push once  heparin  Injectable 5000Unit(s) SubCutaneous every 8 hours  sodium chloride 0.9% lock flush 20milliLiter(s) IV Push once  Parenteral Nutrition - Adult 1Each TPN Continuous <Continuous>  fat emulsion 20% Infusion 50Gram(s) IV Continuous <Continuous>  BACItracin   Ointment 1Application(s) Topical three times a day    MEDICATIONS  (PRN):  ondansetron Injectable 4milliGRAM(s) IV Push every 6 hours PRN Nausea  dextrose Gel 1Dose(s) Oral once PRN Blood Glucose LESS THAN 70 milliGRAM(s)/deciliter  glucagon  Injectable 1milliGRAM(s) IntraMuscular once PRN Glucose LESS THAN 70 milligrams/deciliter  benzocaine 20% Spray 1Spray(s) Topical every 4 hours PRN sore throat  HYDROmorphone  Injectable 0.5milliGRAM(s) IV Push every 6 hours PRN Breakthrough Pain  sodium chloride 0.9% lock flush 10milliLiter(s) IV Push every 1 hour PRN After each medication administration  acetaminophen   Tablet. 650milliGRAM(s) Oral every 6 hours PRN Moderate Pain (4 - 6)      Vital Signs Last 24 Hrs  T(C): 36.6, Max: 36.7 (05-11 @ 12:41)  T(F): 97.8, Max: 98.1 (05-11 @ 16:39)  HR: 75 (75 - 82)  BP: 160/73 (141/79 - 160/73)  BP(mean): --  RR: 16 (16 - 17)  SpO2: 98% (96% - 98%)    PHYSICAL EXAM:      Constitutional: A&Ox3    Respiratory: non labored breathing, no respiratory distress    Cardiovascular: NSR, RRR    Gastrointestinal: soft, non distended, no ttp                 Incision: CDI    Genitourinary: voiding     Extremities: (-) edema                  I&O's Detail    I & Os for current day (as of 12 May 2017 08:29)  =============================================  IN:    TPN (Total Parenteral Nutrition): 1200 ml    Oral Fluid: 420 ml    Fat Emulsion 20%: 208 ml    Total IN: 1828 ml  ---------------------------------------------  OUT:    Voided: 3000 ml    Total OUT: 3000 ml  ---------------------------------------------  Total NET: -1172 ml      LABS:                        9.6    6.1   )-----------( 290      ( 12 May 2017 07:08 )             29.0     05-12    138  |  104  |  19  ----------------------------<  96  3.6   |  26  |  0.67    Ca    8.2<L>      12 May 2017 07:08  Phos  3.3     05-12  Mg     2.2     05-12            RADIOLOGY & ADDITIONAL STUDIES:

## 2017-05-12 NOTE — DISCHARGE NOTE ADULT - CONDITIONS AT DISCHARGE
Patient stable. VSS. NO distress noted. Denies sob/cp. Tolerated diet well and voiding adequately. Pain well managed throughout shift

## 2017-05-12 NOTE — DISCHARGE NOTE ADULT - PLAN OF CARE
tolerate diet. Continue a regular diet.  Do not engage in any strenuous physical activity for 4-6 weeks. Do not lift >20lbs.  You will be discharged on oral pain medication.   Follow up with Dr. Sebas Barragan in 1-2 weeks. call the office to schedule an appointment.  Return to the ED for any worsening abdominal pain, fever>101F/chills, nausea/vomiting, shortness of breath, or chest pain. Continue a regular diet.  Do not engage in any strenuous physical activity for 4-6 weeks. Do not lift >20lbs.  You may take extra strength tylenol for pain  Follow up with Dr. Sebas Barragan in 1-2 weeks. call the office to schedule an appointment.  Return to the ED for any worsening abdominal pain, fever>101F/chills, nausea/vomiting, shortness of breath, or chest pain.

## 2017-05-13 VITALS
TEMPERATURE: 98 F | DIASTOLIC BLOOD PRESSURE: 80 MMHG | RESPIRATION RATE: 16 BRPM | SYSTOLIC BLOOD PRESSURE: 139 MMHG | OXYGEN SATURATION: 100 % | HEART RATE: 81 BPM

## 2017-05-13 PROCEDURE — 99285 EMERGENCY DEPT VISIT HI MDM: CPT | Mod: 25

## 2017-05-13 PROCEDURE — 85025 COMPLETE CBC W/AUTO DIFF WBC: CPT

## 2017-05-13 PROCEDURE — 86923 COMPATIBILITY TEST ELECTRIC: CPT

## 2017-05-13 PROCEDURE — 84295 ASSAY OF SERUM SODIUM: CPT

## 2017-05-13 PROCEDURE — 96374 THER/PROPH/DIAG INJ IV PUSH: CPT | Mod: XU

## 2017-05-13 PROCEDURE — 96375 TX/PRO/DX INJ NEW DRUG ADDON: CPT | Mod: XU

## 2017-05-13 PROCEDURE — 87086 URINE CULTURE/COLONY COUNT: CPT

## 2017-05-13 PROCEDURE — 83735 ASSAY OF MAGNESIUM: CPT

## 2017-05-13 PROCEDURE — 80048 BASIC METABOLIC PNL TOTAL CA: CPT

## 2017-05-13 PROCEDURE — 94002 VENT MGMT INPAT INIT DAY: CPT

## 2017-05-13 PROCEDURE — 81003 URINALYSIS AUTO W/O SCOPE: CPT

## 2017-05-13 PROCEDURE — 36415 COLL VENOUS BLD VENIPUNCTURE: CPT

## 2017-05-13 PROCEDURE — 82550 ASSAY OF CK (CPK): CPT

## 2017-05-13 PROCEDURE — 51702 INSERT TEMP BLADDER CATH: CPT

## 2017-05-13 PROCEDURE — 85027 COMPLETE CBC AUTOMATED: CPT

## 2017-05-13 PROCEDURE — 82330 ASSAY OF CALCIUM: CPT

## 2017-05-13 PROCEDURE — 97161 PT EVAL LOW COMPLEX 20 MIN: CPT

## 2017-05-13 PROCEDURE — 80053 COMPREHEN METABOLIC PANEL: CPT

## 2017-05-13 PROCEDURE — 86850 RBC ANTIBODY SCREEN: CPT

## 2017-05-13 PROCEDURE — 86901 BLOOD TYPING SEROLOGIC RH(D): CPT

## 2017-05-13 PROCEDURE — 76937 US GUIDE VASCULAR ACCESS: CPT

## 2017-05-13 PROCEDURE — 84484 ASSAY OF TROPONIN QUANT: CPT

## 2017-05-13 PROCEDURE — 74020: CPT

## 2017-05-13 PROCEDURE — 97116 GAIT TRAINING THERAPY: CPT

## 2017-05-13 PROCEDURE — 36430 TRANSFUSION BLD/BLD COMPNT: CPT

## 2017-05-13 PROCEDURE — 83605 ASSAY OF LACTIC ACID: CPT

## 2017-05-13 PROCEDURE — P9016: CPT

## 2017-05-13 PROCEDURE — 93005 ELECTROCARDIOGRAM TRACING: CPT | Mod: XU

## 2017-05-13 PROCEDURE — 82310 ASSAY OF CALCIUM: CPT

## 2017-05-13 PROCEDURE — 82040 ASSAY OF SERUM ALBUMIN: CPT

## 2017-05-13 PROCEDURE — 88307 TISSUE EXAM BY PATHOLOGIST: CPT

## 2017-05-13 PROCEDURE — 82553 CREATINE MB FRACTION: CPT

## 2017-05-13 PROCEDURE — 74177 CT ABD & PELVIS W/CONTRAST: CPT

## 2017-05-13 PROCEDURE — 36569 INSJ PICC 5 YR+ W/O IMAGING: CPT

## 2017-05-13 PROCEDURE — 84132 ASSAY OF SERUM POTASSIUM: CPT

## 2017-05-13 PROCEDURE — 93306 TTE W/DOPPLER COMPLETE: CPT

## 2017-05-13 PROCEDURE — 71045 X-RAY EXAM CHEST 1 VIEW: CPT

## 2017-05-13 PROCEDURE — 85018 HEMOGLOBIN: CPT

## 2017-05-13 PROCEDURE — 84100 ASSAY OF PHOSPHORUS: CPT

## 2017-05-13 PROCEDURE — 83690 ASSAY OF LIPASE: CPT

## 2017-05-13 PROCEDURE — 85610 PROTHROMBIN TIME: CPT

## 2017-05-13 PROCEDURE — 83036 HEMOGLOBIN GLYCOSYLATED A1C: CPT

## 2017-05-13 PROCEDURE — 82803 BLOOD GASES ANY COMBINATION: CPT

## 2017-05-13 PROCEDURE — 86900 BLOOD TYPING SEROLOGIC ABO: CPT

## 2017-05-13 PROCEDURE — 85730 THROMBOPLASTIN TIME PARTIAL: CPT

## 2017-05-13 RX ADMIN — HEPARIN SODIUM 5000 UNIT(S): 5000 INJECTION INTRAVENOUS; SUBCUTANEOUS at 06:33

## 2017-05-13 RX ADMIN — Medication 1 APPLICATION(S): at 06:33

## 2017-05-13 NOTE — PROGRESS NOTE ADULT - SUBJECTIVE AND OBJECTIVE BOX
O/N: Several loose BMs. Anxious about going home  5/12: will dc tomorrow O/N: Several loose BMs. Anxious about going home  5/12: will dc tomorrow     SUBJECTIVE:  Patient seen and examined at bedside with surgery chief resident on AM rounds. Pt with no complaints at this time. Tolerating diet, +bowel function.     MEDICATIONS  (STANDING):  insulin lispro (HumaLOG) corrective regimen sliding scale  SubCutaneous Before meals and at bedtime  dextrose 5%. 1000milliLiter(s) IV Continuous <Continuous>  dextrose 50% Injectable 12.5Gram(s) IV Push once  dextrose 50% Injectable 25Gram(s) IV Push once  dextrose 50% Injectable 25Gram(s) IV Push once  heparin  Injectable 5000Unit(s) SubCutaneous every 8 hours  sodium chloride 0.9% lock flush 20milliLiter(s) IV Push once  BACItracin   Ointment 1Application(s) Topical three times a day    MEDICATIONS  (PRN):  ondansetron Injectable 4milliGRAM(s) IV Push every 6 hours PRN Nausea  dextrose Gel 1Dose(s) Oral once PRN Blood Glucose LESS THAN 70 milliGRAM(s)/deciliter  glucagon  Injectable 1milliGRAM(s) IntraMuscular once PRN Glucose LESS THAN 70 milligrams/deciliter  benzocaine 20% Spray 1Spray(s) Topical every 4 hours PRN sore throat  HYDROmorphone  Injectable 0.5milliGRAM(s) IV Push every 6 hours PRN Breakthrough Pain  sodium chloride 0.9% lock flush 10milliLiter(s) IV Push every 1 hour PRN After each medication administration  acetaminophen   Tablet. 650milliGRAM(s) Oral every 6 hours PRN Moderate Pain (4 - 6)      Vital Signs Last 24 Hrs  T(C): 36.6, Max: 37 (05-12 @ 08:33)  T(F): 97.8, Max: 98.6 (05-12 @ 08:33)  HR: 77 (67 - 91)  BP: 137/70 (117/71 - 157/76)  BP(mean): --  RR: 18 (15 - 18)  SpO2: 96% (80% - 100%)    PHYSICAL EXAM:      Constitutional: A&Ox3    Respiratory: non labored breathing, no respiratory distress    Gastrointestinal: Soft, ND, ATTP                 Incision: C/D/I     Genitourinary: voids     Extremities: (-) edema                  I&O's Detail    I & Os for current day (as of 13 May 2017 08:13)  =============================================  IN:    Oral Fluid: 720 ml    Total IN: 720 ml  ---------------------------------------------  OUT:    Voided: 950 ml    Total OUT: 950 ml  ---------------------------------------------  Total NET: -230 ml      LABS:                        9.6    6.1   )-----------( 290      ( 12 May 2017 07:08 )             29.0     05-12    138  |  104  |  19  ----------------------------<  96  3.6   |  26  |  0.67    Ca    8.2<L>      12 May 2017 07:08  Phos  3.3     05-12  Mg     2.2     05-12            RADIOLOGY & ADDITIONAL STUDIES:

## 2017-05-13 NOTE — PROGRESS NOTE ADULT - ASSESSMENT
89yoF with mesenteric ischemia now s/p exlap and small bowel resection (5/6)    soft diet with ensure supplementation  Pain/Nausea control PRN  ISS/SQH/SCDs/OOBA/IS  D/C home today

## 2017-05-15 DIAGNOSIS — J95.822 ACUTE AND CHRONIC POSTPROCEDURAL RESPIRATORY FAILURE: ICD-10-CM

## 2017-05-15 DIAGNOSIS — M19.90 UNSPECIFIED OSTEOARTHRITIS, UNSPECIFIED SITE: ICD-10-CM
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
## 2017-05-15 DIAGNOSIS — Z91.012 ALLERGY TO EGGS: ICD-10-CM

## 2017-05-15 DIAGNOSIS — K55.1 CHRONIC VASCULAR DISORDERS OF INTESTINE: ICD-10-CM

## 2017-05-15 DIAGNOSIS — Z78.1 PHYSICAL RESTRAINT STATUS: ICD-10-CM

## 2017-05-15 DIAGNOSIS — E21.3 HYPERPARATHYROIDISM, UNSPECIFIED: ICD-10-CM

## 2017-05-15 DIAGNOSIS — R10.9 UNSPECIFIED ABDOMINAL PAIN: ICD-10-CM

## 2017-05-15 DIAGNOSIS — E87.2 ACIDOSIS: ICD-10-CM

## 2017-05-15 DIAGNOSIS — K21.9 GASTRO-ESOPHAGEAL REFLUX DISEASE WITHOUT ESOPHAGITIS: ICD-10-CM

## 2017-05-15 DIAGNOSIS — Z88.0 ALLERGY STATUS TO PENICILLIN: ICD-10-CM

## 2017-05-15 DIAGNOSIS — Z53.31 LAPAROSCOPIC SURGICAL PROCEDURE CONVERTED TO OPEN PROCEDURE: ICD-10-CM

## 2017-05-15 DIAGNOSIS — I10 ESSENTIAL (PRIMARY) HYPERTENSION: ICD-10-CM
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
## 2017-05-15 DIAGNOSIS — F03.90 UNSPECIFIED DEMENTIA, UNSPECIFIED SEVERITY, WITHOUT BEHAVIORAL DISTURBANCE, PSYCHOTIC DISTURBANCE, MOOD DISTURBANCE, AND ANXIETY: ICD-10-CM

## 2017-05-15 PROBLEM — E83.51 HYPOCALCEMIA: Chronic | Status: ACTIVE | Noted: 2017-05-05

## 2017-05-16 DIAGNOSIS — K55.029 ACUTE INFARCTION OF SMALL INTESTINE, EXTENT UNSPECIFIED: ICD-10-CM

## 2017-05-16 LAB — SURGICAL PATHOLOGY STUDY: SIGNIFICANT CHANGE UP

## 2017-05-17 ENCOUNTER — OTHER (OUTPATIENT)
Age: 82
End: 2017-05-17

## 2017-05-23 ENCOUNTER — APPOINTMENT (OUTPATIENT)
Dept: SURGERY | Facility: CLINIC | Age: 82
End: 2017-05-23

## 2017-05-23 VITALS
HEIGHT: 61.25 IN | BODY MASS INDEX: 20.5 KG/M2 | TEMPERATURE: 97.4 F | HEART RATE: 75 BPM | SYSTOLIC BLOOD PRESSURE: 136 MMHG | OXYGEN SATURATION: 99 % | WEIGHT: 110 LBS | DIASTOLIC BLOOD PRESSURE: 74 MMHG

## 2017-05-24 DIAGNOSIS — R18.8 OTHER ASCITES: ICD-10-CM

## 2017-05-24 DIAGNOSIS — K59.00 CONSTIPATION, UNSPECIFIED: ICD-10-CM

## 2017-05-24 DIAGNOSIS — Z87.891 PERSONAL HISTORY OF NICOTINE DEPENDENCE: ICD-10-CM

## 2017-05-24 DIAGNOSIS — K56.5 INTESTINAL ADHESIONS [BANDS] WITH OBSTRUCTION (POSTINFECTION): ICD-10-CM

## 2017-05-24 DIAGNOSIS — J95.821 ACUTE POSTPROCEDURAL RESPIRATORY FAILURE: ICD-10-CM

## 2017-05-24 DIAGNOSIS — K56.2 VOLVULUS: ICD-10-CM

## 2017-06-06 ENCOUNTER — APPOINTMENT (OUTPATIENT)
Dept: SURGERY | Facility: CLINIC | Age: 82
End: 2017-06-06

## 2017-06-08 ENCOUNTER — APPOINTMENT (OUTPATIENT)
Dept: SURGERY | Facility: CLINIC | Age: 82
End: 2017-06-08

## 2017-06-08 VITALS
OXYGEN SATURATION: 98 % | WEIGHT: 110 LBS | HEART RATE: 88 BPM | DIASTOLIC BLOOD PRESSURE: 76 MMHG | HEIGHT: 61.25 IN | BODY MASS INDEX: 20.5 KG/M2 | SYSTOLIC BLOOD PRESSURE: 138 MMHG | TEMPERATURE: 97.7 F

## 2017-06-08 DIAGNOSIS — R19.7 DIARRHEA, UNSPECIFIED: ICD-10-CM

## 2017-06-08 DIAGNOSIS — K64.9 UNSPECIFIED HEMORRHOIDS: ICD-10-CM

## 2017-06-20 ENCOUNTER — APPOINTMENT (OUTPATIENT)
Dept: SURGERY | Facility: CLINIC | Age: 82
End: 2017-06-20

## 2017-06-20 VITALS
BODY MASS INDEX: 20.57 KG/M2 | SYSTOLIC BLOOD PRESSURE: 131 MMHG | TEMPERATURE: 97.3 F | OXYGEN SATURATION: 99 % | WEIGHT: 110.38 LBS | DIASTOLIC BLOOD PRESSURE: 68 MMHG | HEART RATE: 73 BPM | HEIGHT: 61.25 IN

## 2018-08-03 ENCOUNTER — APPOINTMENT (OUTPATIENT)
Dept: SURGERY | Facility: CLINIC | Age: 83
End: 2018-08-03

## 2018-08-13 ENCOUNTER — APPOINTMENT (OUTPATIENT)
Dept: INTERNAL MEDICINE | Facility: CLINIC | Age: 83
End: 2018-08-13
Payer: COMMERCIAL

## 2018-08-13 VITALS
TEMPERATURE: 98.2 F | HEIGHT: 61 IN | SYSTOLIC BLOOD PRESSURE: 120 MMHG | OXYGEN SATURATION: 97 % | HEART RATE: 73 BPM | DIASTOLIC BLOOD PRESSURE: 70 MMHG

## 2018-08-13 PROCEDURE — 36415 COLL VENOUS BLD VENIPUNCTURE: CPT

## 2018-08-13 PROCEDURE — 99204 OFFICE O/P NEW MOD 45 MIN: CPT | Mod: 25

## 2018-08-13 NOTE — PHYSICAL EXAM
[No Acute Distress] : no acute distress [Well Nourished] : well nourished [Well Developed] : well developed [Normal Sclera/Conjunctiva] : normal sclera/conjunctiva [EOMI] : extraocular movements intact [Normal Outer Ear/Nose] : the outer ears and nose were normal in appearance [Normal Oropharynx] : the oropharynx was normal [No JVD] : no jugular venous distention [Supple] : supple [No Lymphadenopathy] : no lymphadenopathy [No Respiratory Distress] : no respiratory distress  [Clear to Auscultation] : lungs were clear to auscultation bilaterally [No Accessory Muscle Use] : no accessory muscle use [Normal Rate] : normal rate  [No Edema] : there was no peripheral edema [Normal Gait] : normal gait [Normal Affect] : the affect was normal [Normal Insight/Judgement] : insight and judgment were intact [de-identified] : 3/6 elva KASHMIR

## 2018-08-13 NOTE — HISTORY OF PRESENT ILLNESS
[FreeTextEntry1] : New doctor  -transition of care [de-identified] : This is a 89 yo f with a hx of lactose intolerance,  Hypothyroidism used to be on levothyroxine but has since stopped this.  \par Hx of SBO last year\par She reports she had a parathyroid issue as well- has seen endo at [facility].  \par Reports she prefers to take supplements.\par \par PMH:\par costochondritis 5/2012\par As above\par \par FH:\par no thyroid issues\par \par SocHx:\par former tob\par social ETOH\par \par worked in the theater and studied dance\par 2 adopted children\par \par Meds:\par levothyroxine 50mcg daily\par \par All:\par cozaar\par erythromycin\par PCN\par claritin\par Works out daily

## 2018-08-13 NOTE — PLAN
[FreeTextEntry1] : Check complete labs profile including TSH and PTH levels-   further workup pending results\par Cardiology eval for murmur evaluation\par F/up Dr Grimm for general surgery post SBO in the past.\par Old records reviewed\par t/c michael -\par

## 2018-08-14 LAB
25(OH)D3 SERPL-MCNC: 29 NG/ML
ALBUMIN SERPL ELPH-MCNC: 4.4 G/DL
ALP BLD-CCNC: 54 U/L
ALT SERPL-CCNC: 16 U/L
ANION GAP SERPL CALC-SCNC: 13 MMOL/L
APPEARANCE: CLEAR
AST SERPL-CCNC: 23 U/L
BASOPHILS # BLD AUTO: 0.02 K/UL
BASOPHILS NFR BLD AUTO: 0.3 %
BILIRUB SERPL-MCNC: 0.2 MG/DL
BILIRUBIN URINE: NEGATIVE
BLOOD URINE: NEGATIVE
BUN SERPL-MCNC: 27 MG/DL
CALCIUM SERPL-MCNC: 9.4 MG/DL
CALCIUM SERPL-MCNC: 9.4 MG/DL
CHLORIDE SERPL-SCNC: 101 MMOL/L
CHOLEST SERPL-MCNC: 206 MG/DL
CHOLEST/HDLC SERPL: 2.4 RATIO
CO2 SERPL-SCNC: 26 MMOL/L
COLOR: YELLOW
CREAT SERPL-MCNC: 0.86 MG/DL
EOSINOPHIL # BLD AUTO: 0.05 K/UL
EOSINOPHIL NFR BLD AUTO: 0.8 %
GLUCOSE QUALITATIVE U: NEGATIVE MG/DL
GLUCOSE SERPL-MCNC: 92 MG/DL
HBA1C MFR BLD HPLC: 5.5 %
HCT VFR BLD CALC: 38.3 %
HDLC SERPL-MCNC: 85 MG/DL
HGB BLD-MCNC: 11.9 G/DL
IMM GRANULOCYTES NFR BLD AUTO: 0 %
KETONES URINE: NEGATIVE
LDLC SERPL CALC-MCNC: 97 MG/DL
LEUKOCYTE ESTERASE URINE: NEGATIVE
LYMPHOCYTES # BLD AUTO: 1.58 K/UL
LYMPHOCYTES NFR BLD AUTO: 25.9 %
MAN DIFF?: NORMAL
MCHC RBC-ENTMCNC: 26.4 PG
MCHC RBC-ENTMCNC: 31.1 GM/DL
MCV RBC AUTO: 85.1 FL
MONOCYTES # BLD AUTO: 0.35 K/UL
MONOCYTES NFR BLD AUTO: 5.7 %
NEUTROPHILS # BLD AUTO: 4.11 K/UL
NEUTROPHILS NFR BLD AUTO: 67.3 %
NITRITE URINE: NEGATIVE
PARATHYROID HORMONE INTACT: 92 PG/ML
PH URINE: 7
PHOSPHATE SERPL-MCNC: 3.8 MG/DL
PLATELET # BLD AUTO: 260 K/UL
POTASSIUM SERPL-SCNC: 4.6 MMOL/L
PROT SERPL-MCNC: 7 G/DL
PROTEIN URINE: NEGATIVE MG/DL
RBC # BLD: 4.5 M/UL
RBC # FLD: 15.9 %
SODIUM SERPL-SCNC: 140 MMOL/L
SPECIFIC GRAVITY URINE: 1.02
T3FREE SERPL-MCNC: 1.93 PG/ML
T4 FREE SERPL-MCNC: 1.2 NG/DL
TRIGL SERPL-MCNC: 120 MG/DL
TSH SERPL-ACNC: 1.5 UIU/ML
UROBILINOGEN URINE: NEGATIVE MG/DL
WBC # FLD AUTO: 6.11 K/UL

## 2018-08-21 ENCOUNTER — APPOINTMENT (OUTPATIENT)
Dept: HEART AND VASCULAR | Facility: CLINIC | Age: 83
End: 2018-08-21
Payer: COMMERCIAL

## 2018-08-21 VITALS
HEART RATE: 68 BPM | TEMPERATURE: 98.5 F | RESPIRATION RATE: 14 BRPM | WEIGHT: 110 LBS | BODY MASS INDEX: 20.77 KG/M2 | OXYGEN SATURATION: 97 % | DIASTOLIC BLOOD PRESSURE: 70 MMHG | HEIGHT: 61 IN | SYSTOLIC BLOOD PRESSURE: 118 MMHG

## 2018-08-21 PROCEDURE — 93000 ELECTROCARDIOGRAM COMPLETE: CPT

## 2018-08-21 PROCEDURE — 99203 OFFICE O/P NEW LOW 30 MIN: CPT | Mod: 25

## 2018-08-21 PROCEDURE — 93306 TTE W/DOPPLER COMPLETE: CPT | Mod: XE

## 2018-08-22 ENCOUNTER — APPOINTMENT (OUTPATIENT)
Dept: HEART AND VASCULAR | Facility: CLINIC | Age: 83
End: 2018-08-22

## 2018-10-03 ENCOUNTER — APPOINTMENT (OUTPATIENT)
Dept: ENDOCRINOLOGY | Facility: CLINIC | Age: 83
End: 2018-10-03

## 2018-10-19 ENCOUNTER — APPOINTMENT (OUTPATIENT)
Dept: INTERNAL MEDICINE | Facility: CLINIC | Age: 83
End: 2018-10-19
Payer: COMMERCIAL

## 2018-10-19 VITALS — DIASTOLIC BLOOD PRESSURE: 70 MMHG | SYSTOLIC BLOOD PRESSURE: 174 MMHG

## 2018-10-19 VITALS
OXYGEN SATURATION: 96 % | WEIGHT: 107 LBS | HEIGHT: 61 IN | HEART RATE: 72 BPM | TEMPERATURE: 98.2 F | DIASTOLIC BLOOD PRESSURE: 82 MMHG | BODY MASS INDEX: 20.2 KG/M2 | SYSTOLIC BLOOD PRESSURE: 190 MMHG

## 2018-10-19 VITALS — DIASTOLIC BLOOD PRESSURE: 80 MMHG | SYSTOLIC BLOOD PRESSURE: 142 MMHG

## 2018-10-19 VITALS — SYSTOLIC BLOOD PRESSURE: 184 MMHG | DIASTOLIC BLOOD PRESSURE: 84 MMHG

## 2018-10-19 DIAGNOSIS — R32 UNSPECIFIED URINARY INCONTINENCE: ICD-10-CM

## 2018-10-19 DIAGNOSIS — R03.0 ELEVATED BLOOD-PRESSURE READING, W/OUT DIAGNOSIS OF HYPERTENSION: ICD-10-CM

## 2018-10-19 DIAGNOSIS — Z82.0 FAMILY HISTORY OF EPILEPSY AND OTHER DISEASES OF THE NERVOUS SYSTEM: ICD-10-CM

## 2018-10-19 DIAGNOSIS — Z87.19 PERSONAL HISTORY OF OTHER DISEASES OF THE DIGESTIVE SYSTEM: ICD-10-CM

## 2018-10-19 DIAGNOSIS — I35.0 NONRHEUMATIC AORTIC (VALVE) STENOSIS: ICD-10-CM

## 2018-10-19 PROCEDURE — 99204 OFFICE O/P NEW MOD 45 MIN: CPT | Mod: 25

## 2018-10-19 PROCEDURE — G0008: CPT

## 2018-10-19 PROCEDURE — 90662 IIV NO PRSV INCREASED AG IM: CPT

## 2018-10-19 NOTE — HISTORY OF PRESENT ILLNESS
[FreeTextEntry8] : CC: Feels jumpy\par \par HPI : The patient is a 89-year-old female with history of lactose intolerance, small bowel resection, secondary to adhesions, isolated elevated blood pressure, hyper parathyroidism, and thyroid disorder, who presents for followup. Patient poor historian, states the last few weeks to feel jumpy and as if jumping out of her skin says is secondary to her hyperparathyroidism. She denies cold, heat intolerance, constipation, diarrhea, notes urinary incontinence. She denies chest pain, palpitation, lightheadedness, vision, oriented, x3, but had problems with serial sevens and spelling

## 2018-10-19 NOTE — PHYSICAL EXAM
[No Acute Distress] : no acute distress [Well Nourished] : well nourished [Well Developed] : well developed [Well-Appearing] : well-appearing [Normal Voice/Communication] : normal voice/communication [Normal Sclera/Conjunctiva] : normal sclera/conjunctiva [Normal Outer Ear/Nose] : the outer ears and nose were normal in appearance [Normal Oropharynx] : the oropharynx was normal [No JVD] : no jugular venous distention [Supple] : supple [No Lymphadenopathy] : no lymphadenopathy [No Respiratory Distress] : no respiratory distress  [Clear to Auscultation] : lungs were clear to auscultation bilaterally [No Accessory Muscle Use] : no accessory muscle use [Normal Percussion] : the chest was normal to percussion [Regular Rhythm] : with a regular rhythm [Normal S1, S2] : normal S1 and S2 [Soft] : abdomen soft [Non Tender] : non-tender [Non-distended] : non-distended [No HSM] : no HSM [Normal Bowel Sounds] : normal bowel sounds [Normal Supraclavicular Nodes] : no supraclavicular lymphadenopathy [Normal Axillary Nodes] : no axillary lymphadenopathy [Normal Anterior Cervical Nodes] : no anterior cervical lymphadenopathy [No Rash] : no rash [Speech Grossly Normal] : speech grossly normal [Memory Grossly Normal] : memory grossly normal [Normal Affect] : the affect was normal [Alert and Oriented x3] : oriented to person, place, and time [Normal Mood] : the mood was normal [Normal Insight/Judgement] : insight and judgment were intact [de-identified] : 3/6 systolic murmur [de-identified] : Midline scar

## 2018-10-19 NOTE — REVIEW OF SYSTEMS
[Incontinence] : incontinence [Frequency] : frequency [Negative] : Psychiatric [Dysuria] : no dysuria [Nocturia] : no nocturia [Poor Libido] : libido not poor [Hematuria] : no hematuria [Vaginal Discharge] : no vaginal discharge [Dysmenorrhea] : no dysmenorrhea

## 2018-10-19 NOTE — ASSESSMENT
[FreeTextEntry1] : \par Patient is a 89-year-old female with history of systolic hypertension, hyperparathyroidism, with normal calcium, phosphate, vitamin D, and adhesions with small bowel obstruction, status post resection, who presents for complaint of feeling as, if she's jumping of the skin. \par \par #1 jumping skin\par Etiology unclear, whether hormonal, parathyroid, thyroid, unclear. Will check parathyroid intact hormone TSH, electrolytes, and observe.\par \par #2 systolic hypertension. \par Patient refused antihypertension medication.\par EKG no LVH\par Discussed diet.\par Observe. Consider consulting with children.\par \par #3 mild cognitive decline.\par May have underlying decline, but has decision-making capacity\par \par #4 .Aortic stenosis.\par By history and physical exam.\par A. Symptomatic present time. No elevation EKG\par Will observe.\par \par #4 health maintenance.\par Consider Prevnar, and pneumococcal vaccine.\par Influenza today.\par Blood test.\par Patient has living will. Health care proxy.\par Followup in one

## 2018-10-19 NOTE — HEALTH RISK ASSESSMENT
[No falls in past year] : Patient reported no falls in the past year [0] : 2) Feeling down, depressed, or hopeless: Not at all (0) [] : No [de-identified] : former

## 2018-10-22 LAB
25(OH)D3 SERPL-MCNC: 22.2 NG/ML
ANION GAP SERPL CALC-SCNC: 15 MMOL/L
BUN SERPL-MCNC: 24 MG/DL
CALCIUM SERPL-MCNC: 9.7 MG/DL
CALCIUM SERPL-MCNC: 9.7 MG/DL
CHLORIDE SERPL-SCNC: 103 MMOL/L
CO2 SERPL-SCNC: 28 MMOL/L
CREAT SERPL-MCNC: 0.91 MG/DL
GLUCOSE SERPL-MCNC: 72 MG/DL
PARATHYROID HORMONE INTACT: 57 PG/ML
PHOSPHATE SERPL-MCNC: 3.8 MG/DL
POTASSIUM SERPL-SCNC: 4.5 MMOL/L
SODIUM SERPL-SCNC: 146 MMOL/L
T4 FREE SERPL-MCNC: 1.2 NG/DL
TSH SERPL-ACNC: 1.53 UIU/ML

## 2018-12-06 ENCOUNTER — APPOINTMENT (OUTPATIENT)
Dept: HEART AND VASCULAR | Facility: CLINIC | Age: 83
End: 2018-12-06

## 2018-12-20 ENCOUNTER — APPOINTMENT (OUTPATIENT)
Dept: HEART AND VASCULAR | Facility: CLINIC | Age: 83
End: 2018-12-20
Payer: COMMERCIAL

## 2018-12-20 ENCOUNTER — NON-APPOINTMENT (OUTPATIENT)
Age: 83
End: 2018-12-20

## 2018-12-20 VITALS — HEIGHT: 61 IN | WEIGHT: 106 LBS | BODY MASS INDEX: 20.01 KG/M2

## 2018-12-20 DIAGNOSIS — I35.1 NONRHEUMATIC AORTIC (VALVE) INSUFFICIENCY: ICD-10-CM

## 2018-12-20 DIAGNOSIS — Z00.00 ENCOUNTER FOR GENERAL ADULT MEDICAL EXAMINATION W/OUT ABNORMAL FINDINGS: ICD-10-CM

## 2018-12-20 PROCEDURE — 99204 OFFICE O/P NEW MOD 45 MIN: CPT

## 2018-12-21 VITALS — SYSTOLIC BLOOD PRESSURE: 140 MMHG | DIASTOLIC BLOOD PRESSURE: 80 MMHG | HEART RATE: 68 BPM | RESPIRATION RATE: 16 BRPM

## 2018-12-21 PROBLEM — I35.1 AORTIC REGURGITATION: Status: ACTIVE | Noted: 2018-12-21

## 2018-12-21 NOTE — DISCUSSION/SUMMARY
[FreeTextEntry1] : Impression: Moderate aortic insufficiency with mild aortic stenosis. Patient is currently asymptomatic therefore no indication for a possible TAVR. Her blood pressure is borderline elevated. While I recommended starting medication she is going to try salt restriction and mild to moderate exercise. She is to followup in 6 months. I have had a long conversation about her hectic lifestyle recommended she had her  should look into assisted living programs.

## 2018-12-21 NOTE — REASON FOR VISIT
[Follow-Up - Clinic] : a clinic follow-up of [Aortic Stenosis] : aortic stenosis [FreeTextEntry1] : This 89-year-old woman with mild aortic stenosis comes in for evaluation. Today she denies chest pain, shortness of breath, palpitations or syncope. She states that her personal life with her partially disabled  has been very tachypneic and dizzy. An echocardiogram done August 2018 revealed mild aortic stenosis, moderate aortic insufficiency with normal left ventricular function. She states that she is intolerant of most prescription medications.

## 2018-12-21 NOTE — REVIEW OF SYSTEMS
[Confusion] : confusion was observed [Anxiety] : anxiety [Under Stress] : under stress [Negative] : Heme/Lymph

## 2018-12-21 NOTE — PHYSICAL EXAM
[FreeTextEntry1] : HEENT: normal. No thyromegaly or lymphadenopathy. Neck exam reveals no JVD or bruits.  Carotid pulses are present and symetric.  Heart exam: S1, S2 regular with 2/6 MAYELA RUSB.  The lungs are clear without evidence of wheezes, rhonchi or rales.  Abdomen: Positive bowel sounds, no rebound tenderness. Extremities reveal no clubbing, cyanosis or edema. Neurologic: Grossly nonfocal.

## 2019-02-01 ENCOUNTER — APPOINTMENT (OUTPATIENT)
Dept: INTERNAL MEDICINE | Facility: CLINIC | Age: 84
End: 2019-02-01

## 2019-02-12 ENCOUNTER — APPOINTMENT (OUTPATIENT)
Dept: INTERNAL MEDICINE | Facility: CLINIC | Age: 84
End: 2019-02-12

## 2019-05-08 ENCOUNTER — APPOINTMENT (OUTPATIENT)
Dept: INTERNAL MEDICINE | Facility: CLINIC | Age: 84
End: 2019-05-08

## 2019-05-14 ENCOUNTER — APPOINTMENT (OUTPATIENT)
Dept: INTERNAL MEDICINE | Facility: HOME HEALTH | Age: 84
End: 2019-05-14
Payer: MEDICARE

## 2019-05-14 VITALS
OXYGEN SATURATION: 97 % | WEIGHT: 106 LBS | HEIGHT: 61 IN | RESPIRATION RATE: 18 BRPM | SYSTOLIC BLOOD PRESSURE: 168 MMHG | BODY MASS INDEX: 20.01 KG/M2 | TEMPERATURE: 98 F | HEART RATE: 67 BPM | DIASTOLIC BLOOD PRESSURE: 80 MMHG

## 2019-05-14 DIAGNOSIS — M15.0 PRIMARY GENERALIZED (OSTEO)ARTHRITIS: ICD-10-CM

## 2019-05-14 DIAGNOSIS — Z91.19 PATIENT'S NONCOMPLIANCE WITH OTHER MEDICAL TREATMENT AND REGIMEN: ICD-10-CM

## 2019-05-14 DIAGNOSIS — Z71.89 OTHER SPECIFIED COUNSELING: ICD-10-CM

## 2019-05-14 DIAGNOSIS — R73.03 PREDIABETES.: ICD-10-CM

## 2019-05-14 PROCEDURE — 99497 ADVNCD CARE PLAN 30 MIN: CPT

## 2019-05-14 PROCEDURE — 99345 HOME/RES VST NEW HIGH MDM 75: CPT

## 2019-05-14 PROCEDURE — G0506: CPT

## 2019-05-14 RX ORDER — MAGNESIUM HYDROXIDE 400 MG/5ML
1200 SUSPENSION, ORAL (FINAL DOSE FORM) ORAL 4 TIMES DAILY
Qty: 1 | Refills: 0 | Status: DISCONTINUED | COMMUNITY
Start: 2017-05-17 | End: 2019-05-14

## 2019-05-14 RX ORDER — WITCH HAZEL 0.5 MG/MG
50 SOLUTION RECTAL; TOPICAL
Qty: 60 | Refills: 2 | Status: DISCONTINUED | COMMUNITY
Start: 2017-06-08 | End: 2019-05-14

## 2019-05-14 RX ORDER — GLYCERIN, PHENYLEPHRINE HYDROCHLORIDE, PRAMOXINE HYDROCHLORIDE, WHITE PETROLATUM .28; 4.032; .07; 4.2 G/28G; G/28G; G/28G; G/28G
1-0.25-14.4-15 CREAM TOPICAL
Qty: 1 | Refills: 0 | Status: DISCONTINUED | COMMUNITY
Start: 2017-06-08 | End: 2019-05-14

## 2019-05-14 NOTE — LETTER HEADER
[Care Plan reviewed and provided to patient/caregiver] : Care plan reviewed and provided to patient/caregiver [Care Plan managed/Care coordinated by: ___] : Care plan managed/Care coordinated by: [unfilled] [Care Plan reviewed every ___ weeks] : Care plan reviewed every [unfilled] weeks

## 2019-05-14 NOTE — CURRENT MEDS
[Takes no medications] : Patient takes no medications [Medication and Allergies Reconciled] : medication and allergies reconciled

## 2019-05-14 NOTE — CHRONIC CARE ASSESSMENT
[Oriented To Person] : ~L oriented to person [Oriented To Place] : ~L oriented to place [Oriented To Time] : ~L oriented to time [Reviewed Mini-Cog Outcomes from Comprehensive Assessment] : Reviewed Mini-Cog outcomes from comprehensive assessment [No Action Needed] : No action needed

## 2019-05-17 ENCOUNTER — APPOINTMENT (OUTPATIENT)
Dept: INTERNAL MEDICINE | Facility: HOME HEALTH | Age: 84
End: 2019-05-17
Payer: MEDICARE

## 2019-05-17 VITALS
TEMPERATURE: 98 F | WEIGHT: 106 LBS | HEART RATE: 74 BPM | HEIGHT: 61 IN | DIASTOLIC BLOOD PRESSURE: 60 MMHG | BODY MASS INDEX: 20.01 KG/M2 | SYSTOLIC BLOOD PRESSURE: 130 MMHG | RESPIRATION RATE: 18 BRPM | OXYGEN SATURATION: 98 %

## 2019-05-17 DIAGNOSIS — F22 DELUSIONAL DISORDERS: ICD-10-CM

## 2019-05-17 PROCEDURE — 99349 HOME/RES VST EST MOD MDM 40: CPT

## 2019-05-17 NOTE — HISTORY OF PRESENT ILLNESS
[Patient] : patient [FreeTextEntry1] : Severe paranoia, delusional disorder and anxiety- challenged to leave her home.   [FreeTextEntry2] : MISHEL BINGHAM is an 89 year female who was initially seen on May 14, 2019 with  lactose intolerance, small bowel resection, secondary to adhesions, isolated elevated blood pressure, hyperparathyroid, and thyroid disorder. During her initial visit she noted feeling anxious but did not want any medical treatment. Patient is part of a Life extensions supplement program and refused any kind of "new" medications as she has had bad experiences when taking medications.\par \par Patient called the on-call physician twice with concern that there were multiple cameras being pointed into her home. She noted that the building staff want to kick her out of her apartment.  Patient currently lives in a secure building with doormen and there are trees in front of her window. No cameras visualized.  This was an acute visit to rule out any medical issues as the patient felt unwell.\par \par During our visit she reported that she had a rent stabilized apartment and the building staff are trying to kick her out of the building.  Cameras according to the patient were set up in the building across the street to watch her. The patient placed dark curtains to block the window. She also set up a table in a separate room to eat which she felt the outside cameras cannot visualize.

## 2019-05-17 NOTE — PHYSICAL EXAM
[Normal Outer Ear/Nose] : the ears and nose were normal in appearance [No JVD] : no jugular venous distention [Normal Oropharynx] : the oropharynx was normal [Clear to Auscultation] : lungs were clear to auscultation bilaterally [No Respiratory Distress] : no respiratory distress [Supple] : the neck was supple [Normal S1, S2] : normal S1 and S2 [Normal Rate] : heart rate was normal  [Not Distended] : not distended [Normal Bowel Sounds] : normal bowel sounds [Soft] : abdomen soft [Non Tender] : non-tender [Normal Anterior Cervical Nodes] : no anterior cervical lymphadenopathy [No CVA Tenderness] : no ~M costovertebral angle tenderness [Normal Post Cervical Nodes] : no posterior cervical lymphadenopathy [No Joint Swelling] : no joint swelling seen [No Spinal Tenderness] : no spinal tenderness [No Skin Lesions] : no skin lesions [No Rash] : no rash [Cranial Nerves Intact] : cranial nerves 2-12 were intact [No Motor Deficits] : the motor exam was normal [No Gross Sensory Deficits] : no gross sensory deficits [Normal Mood] : the mood was normal [Oriented x3] : oriented to person, place, and time [de-identified] : Frail appearing female [de-identified] : wearing glasses [de-identified] : paranoid and anxious affect. Possible visual hallucinations.

## 2019-05-17 NOTE — REASON FOR VISIT
[Pre-Visit Preparation] : pre-visit preparation was done [Intercurrent Specialty/Sub-specialty Visits] : the patient has intercurrent specialty/sub-specialty visits [Follow-Up] : a follow-up visit [Spouse] : spouse [Formal Caregiver] : formal caregiver [FreeTextEntry1] : Paranoia, Generalized Anxiety Disorder, Hypothyroidism, Aortic regurgitation, Aortic Stenosis, Hypertension, Hyperparathyroidism, Functional Incontinence

## 2019-05-17 NOTE — COUNSELING
[Normal Weight (BMI <25)] : normal weight - BMI <25 [TLC diet recommended] : TLC diet recommended [Non - Smoker] : non-smoker [Vaccinations: _______] : Vaccinations: [unfilled] [Minimize unnecessary interventions] : minimize unnecessary interventions [Discussed disease trajectory with patient/caregiver] : discussed disease trajectory with patient/caregiver [Completed Medical Orders for Life-Sustaining Treatment] : completed medical orders for life-sustaining treatment [DNR] : Code Status: DNR [DNI] : Intubation: DNI [[facility]: _____[facility]last [facility] date: [unfilled] [Comfort] : Treatment Guidelines: Comfort [FreeTextEntry2] : Call [phone] for all medical concerns

## 2019-05-17 NOTE — REVIEW OF SYSTEMS
[Itching] : Itching [Memory Loss] : memory loss [Negative] : Heme/Lymph [FreeTextEntry4] : ringing in her ears occasionally

## 2019-05-17 NOTE — REVIEW OF SYSTEMS
[Memory Loss] : memory loss [Itching] : Itching [Negative] : Heme/Lymph [Anxiety] : anxiety [Suicidal] : not suicidal [Insomnia] : no insomnia [Depression] : no depression [FreeTextEntry4] : ringing in her ears occasionally [de-identified] : Reports concern that workers in her building are trying to kick her out because she lives in a rent stabilized apartment.  Concern there are cameras set up in the building across the street from her [address] and they are looking into her apartment.

## 2019-05-17 NOTE — HISTORY OF PRESENT ILLNESS
[FreeTextEntry2] : 89-year-old female with, seen today for initial home visit. \par \par MISHEL BINGHAM is an 89 year with  lactose intolerance, small bowel resection, secondary to adhesions, isolated elevated blood pressure, hype parathyroid, and thyroid disorder seen today for initial home visit.\par \par Accompanying patient today is Private HHA Sherita and .\par \par Patient's last hospitalization was May 5,2017 to May 19, 2017 for ischemic bowel.\par \par Since hospitalization patient reports she is back to her baseline as a caregiver to her . Patient reports she is anxious but does not want any medical treatment. Patient is part of a Life extensions supplement program and refuses any kind of "new" medication as she has had bad experiences when taking medications.\par \par Patient reports no weight loss >10 lbs in the past 6 months. No changes in dentition or swallowing reported, No changes in hearing or vision reported. No changes in Cognition reported. Patient denies any symptoms of depression but reports she has had anxiety all her life. Patient is ADL independent and IADL independent. No changes in gait or falls reported. \par \par Patient's home environment is safe.\par  \par Goals of care discussed. MOLST completed. \par \par  \par  \par

## 2019-05-17 NOTE — REASON FOR VISIT
[Initial Evaluation] : an initial evaluation [Pre-Visit Preparation] : pre-visit preparation was done [Intercurrent Specialty/Sub-specialty Visits] : the patient has intercurrent specialty/sub-specialty visits [FreeTextEntry1] : Paranoia, Generalized Anxiety Disorder, Hypothyroidism, Aortic regurgitation, Aortic Stenosis, Hypertension, Hyperparathyroidism, Functional Incontinence

## 2019-05-17 NOTE — PHYSICAL EXAM
[Normal Outer Ear/Nose] : the ears and nose were normal in appearance [Normal Oropharynx] : the oropharynx was normal [No JVD] : no jugular venous distention [Supple] : the neck was supple [No Respiratory Distress] : no respiratory distress [Clear to Auscultation] : lungs were clear to auscultation bilaterally [Normal Rate] : heart rate was normal  [Normal S1, S2] : normal S1 and S2 [Normal Bowel Sounds] : normal bowel sounds [Non Tender] : non-tender [Soft] : abdomen soft [Not Distended] : not distended [Normal Post Cervical Nodes] : no posterior cervical lymphadenopathy [Normal Anterior Cervical Nodes] : no anterior cervical lymphadenopathy [No CVA Tenderness] : no ~M costovertebral angle tenderness [No Spinal Tenderness] : no spinal tenderness [No Joint Swelling] : no joint swelling seen [No Rash] : no rash [No Skin Lesions] : no skin lesions [Cranial Nerves Intact] : cranial nerves 2-12 were intact [No Motor Deficits] : the motor exam was normal [Normal Mood] : the mood was normal [No Gross Sensory Deficits] : no gross sensory deficits [Oriented x3] : oriented to person, place, and time [de-identified] : Frail appearing female [de-identified] : wearing glasses [de-identified] : paranoid and anxious affect

## 2019-05-21 ENCOUNTER — OTHER (OUTPATIENT)
Age: 84
End: 2019-05-21

## 2019-06-19 ENCOUNTER — APPOINTMENT (OUTPATIENT)
Dept: INTERNAL MEDICINE | Facility: CLINIC | Age: 84
End: 2019-06-19
Payer: MEDICARE

## 2019-06-19 VITALS
OXYGEN SATURATION: 98 % | SYSTOLIC BLOOD PRESSURE: 156 MMHG | DIASTOLIC BLOOD PRESSURE: 79 MMHG | TEMPERATURE: 97.4 F | HEART RATE: 73 BPM | RESPIRATION RATE: 16 BRPM

## 2019-06-19 VITALS — WEIGHT: 110 LBS | HEIGHT: 59 IN | BODY MASS INDEX: 22.18 KG/M2

## 2019-06-19 DIAGNOSIS — E03.9 HYPOTHYROIDISM, UNSPECIFIED: ICD-10-CM

## 2019-06-19 DIAGNOSIS — I10 ESSENTIAL (PRIMARY) HYPERTENSION: ICD-10-CM

## 2019-06-19 DIAGNOSIS — E21.3 HYPERPARATHYROIDISM, UNSPECIFIED: ICD-10-CM

## 2019-06-19 DIAGNOSIS — Z59.8 XOTHER PROBLEMS RELATED TO HOUSING AND ECONOMIC CIRCUMSTANCES: ICD-10-CM

## 2019-06-19 DIAGNOSIS — F22 DELUSIONAL DISORDERS: ICD-10-CM

## 2019-06-19 DIAGNOSIS — Z11.1 ENCOUNTER FOR SCREENING FOR RESPIRATORY TUBERCULOSIS: ICD-10-CM

## 2019-06-19 DIAGNOSIS — Z02.89 ENCOUNTER FOR OTHER ADMINISTRATIVE EXAMINATIONS: ICD-10-CM

## 2019-06-19 PROCEDURE — 86580 TB INTRADERMAL TEST: CPT

## 2019-06-19 PROCEDURE — 99204 OFFICE O/P NEW MOD 45 MIN: CPT | Mod: 25

## 2019-06-19 RX ORDER — TELMISARTAN 20 MG/1
20 TABLET ORAL DAILY
Qty: 90 | Refills: 3 | Status: COMPLETED | COMMUNITY
Start: 2019-05-16 | End: 2019-06-19

## 2019-06-19 SDOH — ECONOMIC STABILITY - INCOME SECURITY: OTHER PROBLEMS RELATED TO HOUSING AND ECONOMIC CIRCUMSTANCES: Z59.8

## 2019-06-21 ENCOUNTER — APPOINTMENT (OUTPATIENT)
Dept: INTERNAL MEDICINE | Facility: CLINIC | Age: 84
End: 2019-06-21
Payer: MEDICARE

## 2019-06-21 PROCEDURE — ZZZZZ: CPT

## 2019-08-12 ENCOUNTER — APPOINTMENT (OUTPATIENT)
Dept: HEART AND VASCULAR | Facility: CLINIC | Age: 84
End: 2019-08-12

## 2019-10-29 NOTE — H&P ADULT - NO SIGNIFICANT PAST SURGICAL HISTORY
Patient information on fall and injury prevention
<<----- Click to add NO significant Past Surgical History

## 2021-10-06 PROBLEM — I10 ESSENTIAL HYPERTENSION: Status: ACTIVE | Noted: 2019-05-16

## 2025-07-16 NOTE — DISCHARGE NOTE ADULT - CAREGIVER ADDRESS
Blood pressure is stable today, patient was very sensitive to hydrochlorothiazide, recent blood testing showed normal renal function, continue with current medication.  Orders:    Follow Up In Primary Care - Established    Follow Up In Primary Care - Established; Future    Comprehensive Metabolic Panel; Future     [address] between [facility]